# Patient Record
Sex: FEMALE | Race: BLACK OR AFRICAN AMERICAN | Employment: PART TIME | URBAN - NONMETROPOLITAN AREA
[De-identification: names, ages, dates, MRNs, and addresses within clinical notes are randomized per-mention and may not be internally consistent; named-entity substitution may affect disease eponyms.]

---

## 2017-07-27 ENCOUNTER — HOSPITAL ENCOUNTER (EMERGENCY)
Age: 27
Discharge: HOME | End: 2017-07-27
Attending: EMERGENCY MEDICINE
Payer: MEDICARE

## 2017-07-27 VITALS
TEMPERATURE: 97.7 F | BODY MASS INDEX: 23.54 KG/M2 | HEART RATE: 67 BPM | SYSTOLIC BLOOD PRESSURE: 104 MMHG | DIASTOLIC BLOOD PRESSURE: 75 MMHG | HEIGHT: 67 IN | RESPIRATION RATE: 14 BRPM | WEIGHT: 150 LBS | OXYGEN SATURATION: 100 %

## 2017-07-27 DIAGNOSIS — G43.009 NONINTRACTABLE MIGRAINE, UNSPECIFIED MIGRAINE TYPE: Primary | ICD-10-CM

## 2017-07-27 PROCEDURE — 99283 EMERGENCY DEPT VISIT LOW MDM: CPT

## 2017-07-27 PROCEDURE — 6360000002 HC RX W HCPCS: Performed by: EMERGENCY MEDICINE

## 2017-07-27 PROCEDURE — 2580000003 HC RX 258: Performed by: EMERGENCY MEDICINE

## 2017-07-27 PROCEDURE — 96361 HYDRATE IV INFUSION ADD-ON: CPT

## 2017-07-27 PROCEDURE — 96374 THER/PROPH/DIAG INJ IV PUSH: CPT

## 2017-07-27 PROCEDURE — 96375 TX/PRO/DX INJ NEW DRUG ADDON: CPT

## 2017-07-27 RX ORDER — COVID-19 ANTIGEN TEST
KIT MISCELLANEOUS
COMMUNITY

## 2017-07-27 RX ORDER — DIPHENHYDRAMINE HYDROCHLORIDE 50 MG/ML
25 INJECTION INTRAMUSCULAR; INTRAVENOUS ONCE
Status: COMPLETED | OUTPATIENT
Start: 2017-07-27 | End: 2017-07-27

## 2017-07-27 RX ORDER — 0.9 % SODIUM CHLORIDE 0.9 %
1000 INTRAVENOUS SOLUTION INTRAVENOUS ONCE
Status: COMPLETED | OUTPATIENT
Start: 2017-07-27 | End: 2017-07-27

## 2017-07-27 RX ORDER — KETOROLAC TROMETHAMINE 30 MG/ML
30 INJECTION, SOLUTION INTRAMUSCULAR; INTRAVENOUS ONCE
Status: COMPLETED | OUTPATIENT
Start: 2017-07-27 | End: 2017-07-27

## 2017-07-27 RX ADMIN — PROCHLORPERAZINE EDISYLATE 10 MG: 5 INJECTION INTRAMUSCULAR; INTRAVENOUS at 05:00

## 2017-07-27 RX ADMIN — SODIUM CHLORIDE 1000 ML: 9 INJECTION, SOLUTION INTRAVENOUS at 04:59

## 2017-07-27 RX ADMIN — DIPHENHYDRAMINE HYDROCHLORIDE 25 MG: 50 INJECTION, SOLUTION INTRAMUSCULAR; INTRAVENOUS at 05:00

## 2017-07-27 RX ADMIN — KETOROLAC TROMETHAMINE 30 MG: 30 INJECTION, SOLUTION INTRAMUSCULAR at 04:59

## 2017-07-27 ASSESSMENT — PAIN SCALES - GENERAL
PAINLEVEL_OUTOF10: 2
PAINLEVEL_OUTOF10: 10

## 2017-07-27 ASSESSMENT — PAIN DESCRIPTION - PAIN TYPE
TYPE: ACUTE PAIN
TYPE: ACUTE PAIN

## 2017-07-27 ASSESSMENT — PAIN DESCRIPTION - DESCRIPTORS
DESCRIPTORS: HEADACHE
DESCRIPTORS: HEADACHE;THROBBING

## 2017-07-27 ASSESSMENT — PAIN DESCRIPTION - LOCATION
LOCATION: HEAD
LOCATION: HEAD

## 2017-11-20 ENCOUNTER — HOSPITAL ENCOUNTER (OUTPATIENT)
Dept: WOMENS IMAGING | Age: 27
Discharge: HOME | End: 2017-11-20
Payer: COMMERCIAL

## 2017-11-20 ENCOUNTER — HOSPITAL ENCOUNTER (OUTPATIENT)
Dept: WOMENS IMAGING | Age: 27
End: 2017-11-20
Payer: COMMERCIAL

## 2017-11-20 DIAGNOSIS — Z80.3 FAMILY HISTORY OF BREAST CANCER: ICD-10-CM

## 2017-11-20 PROCEDURE — 76641 ULTRASOUND BREAST COMPLETE: CPT

## 2018-04-10 ENCOUNTER — APPOINTMENT (OUTPATIENT)
Dept: GENERAL RADIOLOGY | Age: 28
End: 2018-04-10
Payer: COMMERCIAL

## 2018-04-10 ENCOUNTER — HOSPITAL ENCOUNTER (EMERGENCY)
Age: 28
Discharge: HOME | End: 2018-04-10
Payer: COMMERCIAL

## 2018-04-10 ENCOUNTER — TELEPHONE (OUTPATIENT)
Dept: ENT CLINIC | Age: 28
End: 2018-04-10

## 2018-04-10 ENCOUNTER — APPOINTMENT (OUTPATIENT)
Dept: ULTRASOUND IMAGING | Age: 28
End: 2018-04-10
Payer: COMMERCIAL

## 2018-04-10 VITALS
RESPIRATION RATE: 18 BRPM | SYSTOLIC BLOOD PRESSURE: 118 MMHG | TEMPERATURE: 98.2 F | DIASTOLIC BLOOD PRESSURE: 75 MMHG | HEART RATE: 78 BPM | OXYGEN SATURATION: 98 % | BODY MASS INDEX: 23.54 KG/M2 | WEIGHT: 150 LBS | HEIGHT: 67 IN

## 2018-04-10 DIAGNOSIS — R22.1 MASS IN NECK: Primary | ICD-10-CM

## 2018-04-10 LAB
ALBUMIN SERPL-MCNC: 4.1 G/DL (ref 3.5–5.1)
ALP BLD-CCNC: 73 U/L (ref 38–126)
ALT SERPL-CCNC: 22 U/L (ref 11–66)
ANION GAP SERPL CALCULATED.3IONS-SCNC: 8 MEQ/L (ref 8–16)
AST SERPL-CCNC: 19 U/L (ref 5–40)
BASOPHILS # BLD: 0.5 %
BASOPHILS ABSOLUTE: 0 THOU/MM3 (ref 0–0.1)
BILIRUB SERPL-MCNC: 0.4 MG/DL (ref 0.3–1.2)
BUN BLDV-MCNC: 8 MG/DL (ref 7–22)
CALCIUM SERPL-MCNC: 8.9 MG/DL (ref 8.5–10.5)
CHLORIDE BLD-SCNC: 103 MEQ/L (ref 98–111)
CO2: 28 MEQ/L (ref 23–33)
CREAT SERPL-MCNC: 0.6 MG/DL (ref 0.4–1.2)
EOSINOPHIL # BLD: 2.7 %
EOSINOPHILS ABSOLUTE: 0.2 THOU/MM3 (ref 0–0.4)
GFR SERPL CREATININE-BSD FRML MDRD: > 90 ML/MIN/1.73M2
GLUCOSE BLD-MCNC: 94 MG/DL (ref 70–108)
HCT VFR BLD CALC: 41.2 % (ref 37–47)
HEMOGLOBIN: 14.3 GM/DL (ref 12–16)
LYMPHOCYTES # BLD: 22.3 %
LYMPHOCYTES ABSOLUTE: 1.5 THOU/MM3 (ref 1–4.8)
MCH RBC QN AUTO: 31.8 PG (ref 27–31)
MCHC RBC AUTO-ENTMCNC: 34.6 GM/DL (ref 33–37)
MCV RBC AUTO: 91.8 FL (ref 81–99)
MONOCYTES # BLD: 5.5 %
MONOCYTES ABSOLUTE: 0.4 THOU/MM3 (ref 0.4–1.3)
NUCLEATED RED BLOOD CELLS: 0 /100 WBC
OSMOLALITY CALCULATION: 275.6 MOSMOL/KG (ref 275–300)
PDW BLD-RTO: 12.9 % (ref 11.5–14.5)
PLATELET # BLD: 179 THOU/MM3 (ref 130–400)
PMV BLD AUTO: 7.8 FL (ref 7.4–10.4)
POTASSIUM SERPL-SCNC: 4.4 MEQ/L (ref 3.5–5.2)
RBC # BLD: 4.49 MILL/MM3 (ref 4.2–5.4)
SEG NEUTROPHILS: 69 %
SEGMENTED NEUTROPHILS ABSOLUTE COUNT: 4.8 THOU/MM3 (ref 1.8–7.7)
SODIUM BLD-SCNC: 139 MEQ/L (ref 135–145)
TOTAL PROTEIN: 6.9 G/DL (ref 6.1–8)
TSH SERPL DL<=0.05 MIU/L-ACNC: 0.74 UIU/ML (ref 0.4–4.2)
WBC # BLD: 6.9 THOU/MM3 (ref 4.8–10.8)

## 2018-04-10 PROCEDURE — 76536 US EXAM OF HEAD AND NECK: CPT

## 2018-04-10 PROCEDURE — 80053 COMPREHEN METABOLIC PANEL: CPT

## 2018-04-10 PROCEDURE — 6370000000 HC RX 637 (ALT 250 FOR IP): Performed by: PHYSICIAN ASSISTANT

## 2018-04-10 PROCEDURE — 70360 X-RAY EXAM OF NECK: CPT

## 2018-04-10 PROCEDURE — 99284 EMERGENCY DEPT VISIT MOD MDM: CPT

## 2018-04-10 PROCEDURE — 84443 ASSAY THYROID STIM HORMONE: CPT

## 2018-04-10 PROCEDURE — 36415 COLL VENOUS BLD VENIPUNCTURE: CPT

## 2018-04-10 PROCEDURE — 85025 COMPLETE CBC W/AUTO DIFF WBC: CPT

## 2018-04-10 RX ADMIN — Medication 30 ML: at 09:48

## 2018-04-10 ASSESSMENT — ENCOUNTER SYMPTOMS
VOMITING: 0
EYE DISCHARGE: 0
PHOTOPHOBIA: 0
SHORTNESS OF BREATH: 0
ABDOMINAL DISTENTION: 0
RHINORRHEA: 0
DIARRHEA: 0
FACIAL SWELLING: 0
SORE THROAT: 0
EYE REDNESS: 0
STRIDOR: 0
COLOR CHANGE: 0
NAUSEA: 0
COUGH: 0

## 2018-07-06 ENCOUNTER — APPOINTMENT (OUTPATIENT)
Dept: CT IMAGING | Age: 28
End: 2018-07-06
Payer: COMMERCIAL

## 2018-07-06 ENCOUNTER — HOSPITAL ENCOUNTER (EMERGENCY)
Age: 28
Discharge: HOME | End: 2018-07-06
Attending: EMERGENCY MEDICINE
Payer: COMMERCIAL

## 2018-07-06 VITALS
RESPIRATION RATE: 20 BRPM | HEIGHT: 67 IN | TEMPERATURE: 97.3 F | HEART RATE: 78 BPM | DIASTOLIC BLOOD PRESSURE: 91 MMHG | OXYGEN SATURATION: 98 % | SYSTOLIC BLOOD PRESSURE: 120 MMHG | WEIGHT: 150 LBS | BODY MASS INDEX: 23.54 KG/M2

## 2018-07-06 DIAGNOSIS — R10.9 LEFT FLANK PAIN: Primary | ICD-10-CM

## 2018-07-06 LAB
ALBUMIN SERPL-MCNC: 4.5 G/DL (ref 3.5–5.1)
ALP BLD-CCNC: 86 U/L (ref 38–126)
ALT SERPL-CCNC: 17 U/L (ref 11–66)
ANION GAP SERPL CALCULATED.3IONS-SCNC: 16 MEQ/L (ref 8–16)
AST SERPL-CCNC: 17 U/L (ref 5–40)
BASOPHILS # BLD: 0.7 %
BASOPHILS ABSOLUTE: 0.1 THOU/MM3 (ref 0–0.1)
BILIRUB SERPL-MCNC: 0.6 MG/DL (ref 0.3–1.2)
BILIRUBIN DIRECT: < 0.2 MG/DL (ref 0–0.3)
BUN BLDV-MCNC: 15 MG/DL (ref 7–22)
CALCIUM SERPL-MCNC: 9.7 MG/DL (ref 8.5–10.5)
CHLORIDE BLD-SCNC: 106 MEQ/L (ref 98–111)
CO2: 21 MEQ/L (ref 23–33)
CREAT SERPL-MCNC: 0.8 MG/DL (ref 0.4–1.2)
EOSINOPHIL # BLD: 2 %
EOSINOPHILS ABSOLUTE: 0.2 THOU/MM3 (ref 0–0.4)
ERYTHROCYTE [DISTWIDTH] IN BLOOD BY AUTOMATED COUNT: 12.2 % (ref 11.5–14.5)
ERYTHROCYTE [DISTWIDTH] IN BLOOD BY AUTOMATED COUNT: 40.9 FL (ref 35–45)
GFR SERPL CREATININE-BSD FRML MDRD: > 90 ML/MIN/1.73M2
GLUCOSE BLD-MCNC: 115 MG/DL (ref 70–108)
HCT VFR BLD CALC: 43.1 % (ref 37–47)
HEMOGLOBIN: 14.8 GM/DL (ref 12–16)
IMMATURE GRANS (ABS): 0.01 THOU/MM3 (ref 0–0.07)
IMMATURE GRANULOCYTES: 0.1 %
LIPASE: 32.1 U/L (ref 5.6–51.3)
LYMPHOCYTES # BLD: 44.1 %
LYMPHOCYTES ABSOLUTE: 4.4 THOU/MM3 (ref 1–4.8)
MCH RBC QN AUTO: 31.5 PG (ref 26–33)
MCHC RBC AUTO-ENTMCNC: 34.3 GM/DL (ref 32.2–35.5)
MCV RBC AUTO: 91.7 FL (ref 81–99)
MONOCYTES # BLD: 7.2 %
MONOCYTES ABSOLUTE: 0.7 THOU/MM3 (ref 0.4–1.3)
NUCLEATED RED BLOOD CELLS: 0 /100 WBC
OSMOLALITY CALCULATION: 286.7 MOSMOL/KG (ref 275–300)
PLATELET # BLD: 225 THOU/MM3 (ref 130–400)
PMV BLD AUTO: 9.9 FL (ref 9.4–12.4)
POTASSIUM SERPL-SCNC: 3.8 MEQ/L (ref 3.5–5.2)
PREGNANCY, SERUM: NEGATIVE
RBC # BLD: 4.7 MILL/MM3 (ref 4.2–5.4)
SEG NEUTROPHILS: 45.9 %
SEGMENTED NEUTROPHILS ABSOLUTE COUNT: 4.5 THOU/MM3 (ref 1.8–7.7)
SODIUM BLD-SCNC: 143 MEQ/L (ref 135–145)
TOTAL PROTEIN: 7.2 G/DL (ref 6.1–8)
WBC # BLD: 9.9 THOU/MM3 (ref 4.8–10.8)

## 2018-07-06 PROCEDURE — 2580000003 HC RX 258: Performed by: EMERGENCY MEDICINE

## 2018-07-06 PROCEDURE — 85025 COMPLETE CBC W/AUTO DIFF WBC: CPT

## 2018-07-06 PROCEDURE — 74176 CT ABD & PELVIS W/O CONTRAST: CPT

## 2018-07-06 PROCEDURE — 96374 THER/PROPH/DIAG INJ IV PUSH: CPT

## 2018-07-06 PROCEDURE — 36415 COLL VENOUS BLD VENIPUNCTURE: CPT

## 2018-07-06 PROCEDURE — 83690 ASSAY OF LIPASE: CPT

## 2018-07-06 PROCEDURE — 6360000002 HC RX W HCPCS: Performed by: EMERGENCY MEDICINE

## 2018-07-06 PROCEDURE — 84703 CHORIONIC GONADOTROPIN ASSAY: CPT

## 2018-07-06 PROCEDURE — 80053 COMPREHEN METABOLIC PANEL: CPT

## 2018-07-06 PROCEDURE — 82248 BILIRUBIN DIRECT: CPT

## 2018-07-06 PROCEDURE — 99284 EMERGENCY DEPT VISIT MOD MDM: CPT

## 2018-07-06 PROCEDURE — 96375 TX/PRO/DX INJ NEW DRUG ADDON: CPT

## 2018-07-06 RX ORDER — HYDROCODONE BITARTRATE AND ACETAMINOPHEN 5; 325 MG/1; MG/1
1 TABLET ORAL EVERY 6 HOURS PRN
Qty: 6 TABLET | Refills: 0 | Status: SHIPPED | OUTPATIENT
Start: 2018-07-06 | End: 2018-07-08

## 2018-07-06 RX ORDER — MORPHINE SULFATE 4 MG/ML
6 INJECTION, SOLUTION INTRAMUSCULAR; INTRAVENOUS ONCE
Status: COMPLETED | OUTPATIENT
Start: 2018-07-06 | End: 2018-07-06

## 2018-07-06 RX ORDER — ONDANSETRON 2 MG/ML
4 INJECTION INTRAMUSCULAR; INTRAVENOUS ONCE
Status: COMPLETED | OUTPATIENT
Start: 2018-07-06 | End: 2018-07-06

## 2018-07-06 RX ORDER — 0.9 % SODIUM CHLORIDE 0.9 %
1000 INTRAVENOUS SOLUTION INTRAVENOUS ONCE
Status: COMPLETED | OUTPATIENT
Start: 2018-07-06 | End: 2018-07-06

## 2018-07-06 RX ADMIN — SODIUM CHLORIDE 1000 ML: 9 INJECTION, SOLUTION INTRAVENOUS at 07:35

## 2018-07-06 RX ADMIN — MORPHINE SULFATE 6 MG: 4 INJECTION, SOLUTION INTRAMUSCULAR; INTRAVENOUS at 07:48

## 2018-07-06 RX ADMIN — ONDANSETRON 4 MG: 2 INJECTION INTRAMUSCULAR; INTRAVENOUS at 07:48

## 2018-07-06 ASSESSMENT — PAIN DESCRIPTION - ORIENTATION: ORIENTATION: LEFT

## 2018-07-06 ASSESSMENT — PAIN DESCRIPTION - PAIN TYPE: TYPE: ACUTE PAIN

## 2018-07-06 ASSESSMENT — PAIN SCALES - GENERAL: PAINLEVEL_OUTOF10: 10

## 2018-07-06 ASSESSMENT — PAIN DESCRIPTION - LOCATION: LOCATION: ABDOMEN;FLANK

## 2018-07-06 NOTE — ED PROVIDER NOTES
[facility]  eMERGENCY dEPARTMENT eNCOUnter          [facility]       Chief Complaint   Patient presents with    Flank Pain     Left       Nurses Notes reviewed and I agree except as noted in the HPI. HISTORY OF PRESENT ILLNESS    Madelaine Pineda is a 32 y.o. female presents to the emergency department for the evaluation of left flank pain. Patient reports that she was sleeping and woke up during the middle of the night and changed positions in which she was sleeping when she began experiencing the pain her left flank. She rates her pain at 10/10 in severity. Patient denies all other complaints at initial evaluation. REVIEW OF SYSTEMS     Constitutional: no fever or chills  Respiratory: no dyspnea  Cardiovascular: no chest pain  Gastrointestinal : Left flank pain  : no dysuria      Remainder of review of systems is otherwise reviewed as negative. PAST MEDICAL HISTORY    has a past medical history of ADHD (attention deficit hyperactivity disorder); Anxiety; Depression; and Headache. SURGICAL HISTORY      has a past surgical history that includes LEEP (03/08/2013) and Colonoscopy ([id number]). CURRENT MEDICATIONS       Previous Medications    NAPROXEN SODIUM (ALEVE) 220 MG CAPS    Take by mouth       ALLERGIES     is allergic to codeine. FAMILY HISTORY     indicated that her mother is alive. She indicated that her father is alive. family history includes Anxiety Disorder in her mother; Cancer in her father; High Blood Pressure in her father. SOCIAL HISTORY      reports that she has never smoked. She has never used smokeless tobacco. She reports that she does not drink alcohol. PHYSICAL EXAM     INITIAL VITALS:  height is 5' 7\" (1.702 m) and weight is 150 lb (68 kg). Her oral temperature is 97.3 °F (36.3 °C). Her blood pressure is 133/80 and her pulse is 78. Her respiration is 20 and oxygen saturation is 97%.     Constitutional: Extremely uncomfortable 07/06/18 7:37 AM    Provider:  I personally performed the services described in the documentation, reviewed and edited the documentation which was dictated to the scribe in my presence, and it accurately records my words and actions.     Pat Parra DO 7/6/18 7:37 AM       Pat Parra, DO  07/06/18 [id number]

## 2018-07-06 NOTE — ED NOTES
Patient medicated per order. Resting in bed. Respirations easy, unlabored. Patient no longer screaming out at this time. Updated on plan of care, need for urine sample. Denies needs or concerns at this time. Call light in reach, side rails up x2.       Brian Perez RN  07/06/18 7905

## 2018-07-06 NOTE — ED NOTES
Patient resting in bed. Respirations easy, unlabored. Patient denies pain at this time, is able to sit up in bed and speak with staff and family at bedside. Denies needs or concerns at this time. Call light in reach, side rails up x2.       Toby Hanson, RN  07/06/18 7914

## 2018-07-15 ENCOUNTER — HOSPITAL ENCOUNTER (EMERGENCY)
Age: 28
Discharge: HOME | End: 2018-07-15
Payer: COMMERCIAL

## 2018-07-15 VITALS
SYSTOLIC BLOOD PRESSURE: 123 MMHG | HEART RATE: 84 BPM | DIASTOLIC BLOOD PRESSURE: 76 MMHG | OXYGEN SATURATION: 99 % | RESPIRATION RATE: 18 BRPM | TEMPERATURE: 97.6 F

## 2018-07-15 DIAGNOSIS — L23.7 POISON IVY DERMATITIS: Primary | ICD-10-CM

## 2018-07-15 PROCEDURE — 99202 OFFICE O/P NEW SF 15 MIN: CPT | Performed by: NURSE PRACTITIONER

## 2018-07-15 PROCEDURE — 99212 OFFICE O/P EST SF 10 MIN: CPT

## 2018-07-15 PROCEDURE — 96372 THER/PROPH/DIAG INJ SC/IM: CPT

## 2018-07-15 PROCEDURE — 6360000002 HC RX W HCPCS: Performed by: NURSE PRACTITIONER

## 2018-07-15 RX ORDER — PREDNISONE 10 MG/1
10 TABLET ORAL SEE ADMIN INSTRUCTIONS
Qty: 21 TABLET | Refills: 0 | Status: SHIPPED | OUTPATIENT
Start: 2018-07-15 | End: 2018-07-21

## 2018-07-15 RX ORDER — METHYLPREDNISOLONE ACETATE 80 MG/ML
80 INJECTION, SUSPENSION INTRA-ARTICULAR; INTRALESIONAL; INTRAMUSCULAR; SOFT TISSUE ONCE
Status: COMPLETED | OUTPATIENT
Start: 2018-07-15 | End: 2018-07-15

## 2018-07-15 RX ORDER — SKIN CLEANSER COMB NO.41
CLEANSER (ML) TOPICAL
Refills: 0 | COMMUNITY
Start: 2018-07-15 | End: 2022-07-22

## 2018-07-15 RX ORDER — DIPHENHYDRAMINE HCL 25 MG
25 CAPSULE ORAL EVERY 6 HOURS PRN
Refills: 0 | COMMUNITY
Start: 2018-07-15 | End: 2018-07-25

## 2018-07-15 RX ADMIN — METHYLPREDNISOLONE ACETATE 80 MG: 80 INJECTION, SUSPENSION INTRA-ARTICULAR; INTRALESIONAL; INTRAMUSCULAR; SOFT TISSUE at 12:08

## 2018-07-15 ASSESSMENT — ENCOUNTER SYMPTOMS
CHEST TIGHTNESS: 0
PERI-ORBITAL EDEMA: 0
DIARRHEA: 0
SORE THROAT: 0
COUGH: 0
ABDOMINAL PAIN: 0
THROAT SWELLING: 0
VOMITING: 0
APNEA: 0
NAUSEA: 0
HOARSE VOICE: 0
SHORTNESS OF BREATH: 0
EYE ITCHING: 0
STRIDOR: 0
WHEEZING: 0
CHOKING: 0

## 2018-07-15 ASSESSMENT — PAIN DESCRIPTION - DESCRIPTORS: DESCRIPTORS: ITCHING

## 2018-07-15 ASSESSMENT — PAIN SCALES - GENERAL: PAINLEVEL_OUTOF10: 1

## 2018-07-15 ASSESSMENT — PAIN DESCRIPTION - LOCATION: LOCATION: GENERALIZED

## 2018-07-15 NOTE — ED PROVIDER NOTES
[id number]  Urgent Care Encounter       CHIEF COMPLAINT       Chief Complaint   Patient presents with    Rash     poison ivy        Nurses Notes reviewed and I agree except as noted in the HPI. HISTORY OF PRESENT ILLNESS   Madelaine Aguilar is a 29 y.o. female who presents     The history is provided by the patient. No  was used. Rash   Location:  Shoulder/arm and leg  Shoulder/arm rash location:  L forearm, R forearm, L upper arm and R upper arm  Leg rash location:  L leg and R leg  Quality: dryness, itchiness and redness    Quality: not blistering, not bruising, not burning, not draining, not painful, not peeling, not scaling, not swelling and not weeping    Severity:  Moderate  Onset quality:  Gradual  Duration:  1 week  Timing:  Constant  Progression:  Spreading  Chronicity:  Recurrent (Works in [address] continued exposure)  Context: plant contact    Context: not animal contact, not chemical exposure, not diapers, not eggs, not exposure to similar rash, not food, not hot tub use, not insect bite/sting, not medications, not new detergent/soap, not nuts, not pollen, not pregnancy, not sick contacts and not sun exposure    Relieved by:  Nothing  Worsened by:  Nothing  Ineffective treatments:  Anti-itch cream (OTC cream, spray)  Associated symptoms: induration    Associated symptoms: no abdominal pain, no diarrhea, no fatigue, no fever, no headaches, no hoarse voice, no joint pain, no myalgias, no nausea, no periorbital edema, no shortness of breath, no sore throat, no throat swelling, no tongue swelling, no URI, not vomiting and not wheezing        REVIEW OF SYSTEMS     Review of Systems   Constitutional: Negative for activity change, appetite change, chills, diaphoresis, fatigue and fever. HENT: Negative for congestion, hoarse voice and sore throat. Eyes: Negative for itching.    Respiratory: Negative for apnea, cough, choking, chest tightness, shortness of breath, wheezing and stridor. Cardiovascular: Negative for chest pain, palpitations and leg swelling. Gastrointestinal: Negative for abdominal pain, diarrhea, nausea and vomiting. Musculoskeletal: Negative for arthralgias and myalgias. Skin: Positive for rash. Negative for pallor and wound. Neurological: Negative for dizziness, facial asymmetry, weakness, light-headedness, numbness and headaches. Psychiatric/Behavioral: Negative for sleep disturbance. PAST MEDICAL HISTORY         Diagnosis Date    ADHD (attention deficit hyperactivity disorder) 1997    Anxiety     Depression     Headache(784.0)        SURGICAL HISTORY     Patient  has a past surgical history that includes LEEP (03/08/2013) and Colonoscopy ([id number]). CURRENT MEDICATIONS       Previous Medications    NAPROXEN SODIUM (ALEVE) 220 MG CAPS    Take by mouth       ALLERGIES     Patient is is allergic to codeine. Patients   There is no immunization history on file for this patient. FAMILY HISTORY     Patient's family history includes Anxiety Disorder in her mother; Cancer in her father; High Blood Pressure in her father. SOCIAL HISTORY     Patient  reports that she has never smoked. She has never used smokeless tobacco. She reports that she does not drink alcohol or use drugs. PHYSICAL EXAM     ED TRIAGE VITALS  BP: 123/76, Temp: 97.6 °F (36.4 °C), Pulse: 84, Resp: 18, SpO2: 99 %,Estimated body mass index is 23.49 kg/m² as calculated from the following:    Height as of 7/6/18: 5' 7\" (1.702 m). Weight as of 7/6/18: 150 lb (68 kg). ,No LMP recorded. Patient has had an injection. Physical Exam   Constitutional: She is oriented to person, place, and time. She appears well-developed and well-nourished. Non-toxic appearance. She does not appear ill. No distress. HENT:   Head: Normocephalic. Eyes: Conjunctivae are normal.   Neck: Normal range of motion. Neck supple.    Pulmonary/Chest: Effort normal. No respiratory

## 2020-09-17 ENCOUNTER — HOSPITAL ENCOUNTER (OUTPATIENT)
Age: 30
Discharge: HOME | End: 2020-09-17
Attending: OBSTETRICS & GYNECOLOGY | Admitting: OBSTETRICS & GYNECOLOGY
Payer: COMMERCIAL

## 2020-09-17 VITALS
OXYGEN SATURATION: 98 % | WEIGHT: 183 LBS | HEART RATE: 87 BPM | SYSTOLIC BLOOD PRESSURE: 137 MMHG | TEMPERATURE: 97.6 F | HEIGHT: 67 IN | DIASTOLIC BLOOD PRESSURE: 88 MMHG | BODY MASS INDEX: 28.72 KG/M2 | RESPIRATION RATE: 18 BRPM

## 2020-09-17 LAB
ALBUMIN SERPL-MCNC: 3.3 G/DL (ref 3.5–5.1)
ALP BLD-CCNC: 76 U/L (ref 38–126)
ALT SERPL-CCNC: 15 U/L (ref 11–66)
ANION GAP SERPL CALCULATED.3IONS-SCNC: 11 MEQ/L (ref 8–16)
AST SERPL-CCNC: 23 U/L (ref 5–40)
BASOPHILS # BLD: 0.2 %
BASOPHILS ABSOLUTE: 0 THOU/MM3 (ref 0–0.1)
BILIRUB SERPL-MCNC: 0.3 MG/DL (ref 0.3–1.2)
BUN BLDV-MCNC: 5 MG/DL (ref 7–22)
CALCIUM SERPL-MCNC: 8.6 MG/DL (ref 8.5–10.5)
CHLORIDE BLD-SCNC: 107 MEQ/L (ref 98–111)
CO2: 22 MEQ/L (ref 23–33)
CREAT SERPL-MCNC: 0.5 MG/DL (ref 0.4–1.2)
CREATININE URINE: 255 MG/DL
EOSINOPHIL # BLD: 0.7 %
EOSINOPHILS ABSOLUTE: 0.1 THOU/MM3 (ref 0–0.4)
ERYTHROCYTE [DISTWIDTH] IN BLOOD BY AUTOMATED COUNT: 12.7 % (ref 11.5–14.5)
ERYTHROCYTE [DISTWIDTH] IN BLOOD BY AUTOMATED COUNT: 43.6 FL (ref 35–45)
GFR SERPL CREATININE-BSD FRML MDRD: > 90 ML/MIN/1.73M2
GLUCOSE BLD-MCNC: 83 MG/DL (ref 70–108)
HCT VFR BLD CALC: 35.5 % (ref 37–47)
HEMOGLOBIN: 12 GM/DL (ref 12–16)
IMMATURE GRANS (ABS): 0.07 THOU/MM3 (ref 0–0.07)
IMMATURE GRANULOCYTES: 0.7 %
LYMPHOCYTES # BLD: 14.8 %
LYMPHOCYTES ABSOLUTE: 1.4 THOU/MM3 (ref 1–4.8)
MCH RBC QN AUTO: 31.8 PG (ref 26–33)
MCHC RBC AUTO-ENTMCNC: 33.8 GM/DL (ref 32.2–35.5)
MCV RBC AUTO: 94.2 FL (ref 81–99)
MONOCYTES # BLD: 6.7 %
MONOCYTES ABSOLUTE: 0.6 THOU/MM3 (ref 0.4–1.3)
NUCLEATED RED BLOOD CELLS: 0 /100 WBC
PLATELET # BLD: 155 THOU/MM3 (ref 130–400)
PMV BLD AUTO: 9.9 FL (ref 9.4–12.4)
POTASSIUM SERPL-SCNC: 3.6 MEQ/L (ref 3.5–5.2)
PROT/CREAT RATIO, UR: 0.15
PROTEIN, URINE: 38.6 MG/DL
RBC # BLD: 3.77 MILL/MM3 (ref 4.2–5.4)
SEG NEUTROPHILS: 76.9 %
SEGMENTED NEUTROPHILS ABSOLUTE COUNT: 7.5 THOU/MM3 (ref 1.8–7.7)
SODIUM BLD-SCNC: 140 MEQ/L (ref 135–145)
TOTAL PROTEIN: 6.1 G/DL (ref 6.1–8)
URIC ACID: 4.2 MG/DL (ref 2.4–5.7)
WBC # BLD: 9.7 THOU/MM3 (ref 4.8–10.8)

## 2020-09-17 PROCEDURE — 96360 HYDRATION IV INFUSION INIT: CPT

## 2020-09-17 PROCEDURE — 96361 HYDRATE IV INFUSION ADD-ON: CPT

## 2020-09-17 PROCEDURE — 80053 COMPREHEN METABOLIC PANEL: CPT

## 2020-09-17 PROCEDURE — 84156 ASSAY OF PROTEIN URINE: CPT

## 2020-09-17 PROCEDURE — 84550 ASSAY OF BLOOD/URIC ACID: CPT

## 2020-09-17 PROCEDURE — 2580000003 HC RX 258: Performed by: OBSTETRICS & GYNECOLOGY

## 2020-09-17 PROCEDURE — 85025 COMPLETE CBC W/AUTO DIFF WBC: CPT

## 2020-09-17 PROCEDURE — 82570 ASSAY OF URINE CREATININE: CPT

## 2020-09-17 PROCEDURE — 36415 COLL VENOUS BLD VENIPUNCTURE: CPT

## 2020-09-17 RX ORDER — SODIUM CHLORIDE, SODIUM LACTATE, POTASSIUM CHLORIDE, CALCIUM CHLORIDE 600; 310; 30; 20 MG/100ML; MG/100ML; MG/100ML; MG/100ML
INJECTION, SOLUTION INTRAVENOUS CONTINUOUS
Status: DISCONTINUED | OUTPATIENT
Start: 2020-09-17 | End: 2020-09-17 | Stop reason: HOSPADM

## 2020-09-17 RX ADMIN — SODIUM CHLORIDE, POTASSIUM CHLORIDE, SODIUM LACTATE AND CALCIUM CHLORIDE: 600; 310; 30; 20 INJECTION, SOLUTION INTRAVENOUS at 18:42

## 2020-09-17 RX ADMIN — SODIUM CHLORIDE, POTASSIUM CHLORIDE, SODIUM LACTATE AND CALCIUM CHLORIDE: 600; 310; 30; 20 INJECTION, SOLUTION INTRAVENOUS at 17:40

## 2020-09-17 RX ADMIN — SODIUM CHLORIDE, POTASSIUM CHLORIDE, SODIUM LACTATE AND CALCIUM CHLORIDE: 600; 310; 30; 20 INJECTION, SOLUTION INTRAVENOUS at 18:44

## 2020-09-17 NOTE — PLAN OF CARE
Problem: Healthcare acquired conditions:  Goal: Absence of healthcare acquired conditions  Description: Absence of healthcare acquired conditions  Outcome: Ongoing  Note: FHTs reactive, [place] in place no contractions noted, denies pain at this time, Labs sent see results,    Care plan reviewed with patient and significant other. Patient and significant other verbalize understanding of the plan of care and contribute to goal setting.

## 2020-09-17 NOTE — FLOWSHEET NOTE
admitted at 28/5 weeks gest after having fainted at home. Denies pain or SROM. States feeling fetal movements but states she has not felt good for past two days and has not ate or drank much either.  EFM applied to soft non tender abd

## 2020-09-18 NOTE — FLOWSHEET NOTE
Discharge instructions reviewed, questions answered, pt states comfortable with being discharged, monitors removed from soft non tender abdomen, IV removed, pt up to change. Discharge instructions signed.

## 2020-09-18 NOTE — FLOWSHEET NOTE
Pt ambulated off unit stable with discharge instructions, belongings in hand, significant other at side.

## 2020-10-17 NOTE — PROGRESS NOTES
[address] [id number]                                NON STRESS TEST    PATIENT NAME: Zana Hinton                :        1990  MED REC NO:   [id number]                           ROOM:       0005  ACCOUNT NO:   [de-identified]                           ADMIT DATE: 2020  PROVIDER:     JOSE ELIAS Rowelln:  2020    INDICATIONS:  The patient is a 70-year-old  who presented at 29 and  5 weeks after she passed out. She was evaluated with prolonged  monitoring and had a reactive tracing 140 with moderate variability and  accelerations. No evidence of contractions on the tocometer. Her vital  signs were normal.  She was discharged home, precautions reviewed, and  instructed to follow up as scheduled in the office.         Jhon Metcalf M.D.    D: 10/16/2020 10:49:41       T: 10/16/2020 15:34:35     [email or web address]  Job#: [id number]     Doc#: [id number]    CC:

## 2020-11-13 ENCOUNTER — HOSPITAL ENCOUNTER (OUTPATIENT)
Age: 30
Discharge: HOME | DRG: 560 | End: 2020-11-13
Attending: OBSTETRICS & GYNECOLOGY | Admitting: OBSTETRICS & GYNECOLOGY
Payer: COMMERCIAL

## 2020-11-13 VITALS
HEART RATE: 80 BPM | DIASTOLIC BLOOD PRESSURE: 76 MMHG | TEMPERATURE: 98.2 F | OXYGEN SATURATION: 99 % | RESPIRATION RATE: 18 BRPM | SYSTOLIC BLOOD PRESSURE: 119 MMHG

## 2020-11-13 PROBLEM — O13.3 GESTATIONAL HYPERTENSION, THIRD TRIMESTER: Status: ACTIVE | Noted: 2020-11-13

## 2020-11-13 LAB
ABO: NORMAL
ALBUMIN SERPL-MCNC: 3.7 G/DL (ref 3.5–5.1)
ALP BLD-CCNC: 121 U/L (ref 38–126)
ALT SERPL-CCNC: 10 U/L (ref 11–66)
ANION GAP SERPL CALCULATED.3IONS-SCNC: 13 MEQ/L (ref 8–16)
ANTIBODY SCREEN: NORMAL
AST SERPL-CCNC: 21 U/L (ref 5–40)
BILIRUB SERPL-MCNC: 0.5 MG/DL (ref 0.3–1.2)
BUN BLDV-MCNC: 5 MG/DL (ref 7–22)
CALCIUM SERPL-MCNC: 9.4 MG/DL (ref 8.5–10.5)
CHLORIDE BLD-SCNC: 106 MEQ/L (ref 98–111)
CO2: 22 MEQ/L (ref 23–33)
CREAT SERPL-MCNC: 0.5 MG/DL (ref 0.4–1.2)
CREATININE URINE: 172.1 MG/DL
ERYTHROCYTE [DISTWIDTH] IN BLOOD BY AUTOMATED COUNT: 12.9 % (ref 11.5–14.5)
ERYTHROCYTE [DISTWIDTH] IN BLOOD BY AUTOMATED COUNT: 43.9 FL (ref 35–45)
GFR SERPL CREATININE-BSD FRML MDRD: > 90 ML/MIN/1.73M2
GLUCOSE BLD-MCNC: 71 MG/DL (ref 70–108)
HCT VFR BLD CALC: 35.4 % (ref 37–47)
HEMOGLOBIN: 11.9 GM/DL (ref 12–16)
MCH RBC QN AUTO: 31.5 PG (ref 26–33)
MCHC RBC AUTO-ENTMCNC: 33.6 GM/DL (ref 32.2–35.5)
MCV RBC AUTO: 93.7 FL (ref 81–99)
PLATELET # BLD: 148 THOU/MM3 (ref 130–400)
PMV BLD AUTO: 10.3 FL (ref 9.4–12.4)
POTASSIUM SERPL-SCNC: 3.9 MEQ/L (ref 3.5–5.2)
PROT/CREAT RATIO, UR: 0.1
PROTEIN, URINE: 17.7 MG/DL
RBC # BLD: 3.78 MILL/MM3 (ref 4.2–5.4)
RH FACTOR: NORMAL
RPR: NONREACTIVE
SODIUM BLD-SCNC: 141 MEQ/L (ref 135–145)
TOTAL PROTEIN: 6.4 G/DL (ref 6.1–8)
WBC # BLD: 9.7 THOU/MM3 (ref 4.8–10.8)

## 2020-11-13 PROCEDURE — 86850 RBC ANTIBODY SCREEN: CPT

## 2020-11-13 PROCEDURE — 80053 COMPREHEN METABOLIC PANEL: CPT

## 2020-11-13 PROCEDURE — 85027 COMPLETE CBC AUTOMATED: CPT

## 2020-11-13 PROCEDURE — 86592 SYPHILIS TEST NON-TREP QUAL: CPT

## 2020-11-13 PROCEDURE — 86901 BLOOD TYPING SEROLOGIC RH(D): CPT

## 2020-11-13 PROCEDURE — 84156 ASSAY OF PROTEIN URINE: CPT

## 2020-11-13 PROCEDURE — 86900 BLOOD TYPING SEROLOGIC ABO: CPT

## 2020-11-13 PROCEDURE — 2580000003 HC RX 258: Performed by: OBSTETRICS & GYNECOLOGY

## 2020-11-13 PROCEDURE — 82570 ASSAY OF URINE CREATININE: CPT

## 2020-11-13 PROCEDURE — 36415 COLL VENOUS BLD VENIPUNCTURE: CPT

## 2020-11-13 RX ORDER — NIFEDIPINE 10 MG/1
30 CAPSULE ORAL DAILY
Status: ON HOLD | COMMUNITY
End: 2020-11-17 | Stop reason: HOSPADM

## 2020-11-13 RX ORDER — SODIUM CHLORIDE, SODIUM LACTATE, POTASSIUM CHLORIDE, CALCIUM CHLORIDE 600; 310; 30; 20 MG/100ML; MG/100ML; MG/100ML; MG/100ML
INJECTION, SOLUTION INTRAVENOUS CONTINUOUS
Status: DISCONTINUED | OUTPATIENT
Start: 2020-11-13 | End: 2020-11-13 | Stop reason: HOSPADM

## 2020-11-13 RX ADMIN — SODIUM CHLORIDE, POTASSIUM CHLORIDE, SODIUM LACTATE AND CALCIUM CHLORIDE: 600; 310; 30; 20 INJECTION, SOLUTION INTRAVENOUS at 13:00

## 2020-11-13 NOTE — FLOWSHEET NOTE
Dr Ajith Li called with labs, BPs, reactive FHR tracing. Informed pt still has a headache but denies wanting any pain meds for it. Pt talking with people on phone, cheerful, laughing and talkative. Orders received for discharge.

## 2020-11-16 ENCOUNTER — ANESTHESIA (OUTPATIENT)
Dept: LABOR AND DELIVERY | Age: 30
DRG: 560 | End: 2020-11-16
Payer: COMMERCIAL

## 2020-11-16 ENCOUNTER — ANESTHESIA EVENT (OUTPATIENT)
Dept: LABOR AND DELIVERY | Age: 30
DRG: 560 | End: 2020-11-16
Payer: COMMERCIAL

## 2020-11-16 ENCOUNTER — HOSPITAL ENCOUNTER (INPATIENT)
Age: 30
LOS: 1 days | Discharge: HOME | DRG: 560 | End: 2020-11-17
Attending: OBSTETRICS & GYNECOLOGY | Admitting: OBSTETRICS & GYNECOLOGY
Payer: COMMERCIAL

## 2020-11-16 LAB
ABO: NORMAL
ALBUMIN SERPL-MCNC: 3.7 G/DL (ref 3.5–5.1)
ALP BLD-CCNC: 128 U/L (ref 38–126)
ALT SERPL-CCNC: 9 U/L (ref 11–66)
AMPHETAMINE+METHAMPHETAMINE URINE SCREEN: NEGATIVE
ANION GAP SERPL CALCULATED.3IONS-SCNC: 15 MEQ/L (ref 8–16)
ANTIBODY SCREEN: NORMAL
AST SERPL-CCNC: 15 U/L (ref 5–40)
BARBITURATE QUANTITATIVE URINE: NEGATIVE
BENZODIAZEPINE QUANTITATIVE URINE: NEGATIVE
BILIRUB SERPL-MCNC: 0.5 MG/DL (ref 0.3–1.2)
BUN BLDV-MCNC: 5 MG/DL (ref 7–22)
CALCIUM SERPL-MCNC: 9.2 MG/DL (ref 8.5–10.5)
CANNABINOID QUANTITATIVE URINE: NEGATIVE
CHLORIDE BLD-SCNC: 104 MEQ/L (ref 98–111)
CO2: 19 MEQ/L (ref 23–33)
COCAINE METABOLITE QUANTITATIVE URINE: NEGATIVE
CREAT SERPL-MCNC: 0.5 MG/DL (ref 0.4–1.2)
CREATININE URINE: 236.6 MG/DL
ERYTHROCYTE [DISTWIDTH] IN BLOOD BY AUTOMATED COUNT: 12.8 % (ref 11.5–14.5)
ERYTHROCYTE [DISTWIDTH] IN BLOOD BY AUTOMATED COUNT: 43.2 FL (ref 35–45)
GFR SERPL CREATININE-BSD FRML MDRD: > 90 ML/MIN/1.73M2
GLUCOSE BLD-MCNC: 82 MG/DL (ref 70–108)
HCT VFR BLD CALC: 36.2 % (ref 37–47)
HEMOGLOBIN: 12 GM/DL (ref 12–16)
MCH RBC QN AUTO: 30.8 PG (ref 26–33)
MCHC RBC AUTO-ENTMCNC: 33.1 GM/DL (ref 32.2–35.5)
MCV RBC AUTO: 92.8 FL (ref 81–99)
OPIATES, URINE: NEGATIVE
OXYCODONE: NEGATIVE
PHENCYCLIDINE QUANTITATIVE URINE: NEGATIVE
PLATELET # BLD: 155 THOU/MM3 (ref 130–400)
PMV BLD AUTO: 10 FL (ref 9.4–12.4)
POTASSIUM SERPL-SCNC: 3.6 MEQ/L (ref 3.5–5.2)
PROT/CREAT RATIO, UR: 0.17
PROTEIN, URINE: 40 MG/DL
RBC # BLD: 3.9 MILL/MM3 (ref 4.2–5.4)
RH FACTOR: NORMAL
RPR: NONREACTIVE
SODIUM BLD-SCNC: 138 MEQ/L (ref 135–145)
TOTAL PROTEIN: 6.4 G/DL (ref 6.1–8)
WBC # BLD: 10.4 THOU/MM3 (ref 4.8–10.8)

## 2020-11-16 PROCEDURE — 36415 COLL VENOUS BLD VENIPUNCTURE: CPT

## 2020-11-16 PROCEDURE — 10907ZC DRAINAGE OF AMNIOTIC FLUID, THERAPEUTIC FROM PRODUCTS OF CONCEPTION, VIA NATURAL OR ARTIFICIAL OPENING: ICD-10-PCS | Performed by: OBSTETRICS & GYNECOLOGY

## 2020-11-16 PROCEDURE — 80053 COMPREHEN METABOLIC PANEL: CPT

## 2020-11-16 PROCEDURE — 6360000002 HC RX W HCPCS

## 2020-11-16 PROCEDURE — 3700000025 EPIDURAL BLOCK: Performed by: ANESTHESIOLOGY

## 2020-11-16 PROCEDURE — 86900 BLOOD TYPING SEROLOGIC ABO: CPT

## 2020-11-16 PROCEDURE — 82570 ASSAY OF URINE CREATININE: CPT

## 2020-11-16 PROCEDURE — 86901 BLOOD TYPING SEROLOGIC RH(D): CPT

## 2020-11-16 PROCEDURE — 6370000000 HC RX 637 (ALT 250 FOR IP): Performed by: ANESTHESIOLOGY

## 2020-11-16 PROCEDURE — 6370000000 HC RX 637 (ALT 250 FOR IP): Performed by: OBSTETRICS & GYNECOLOGY

## 2020-11-16 PROCEDURE — 6360000002 HC RX W HCPCS: Performed by: OBSTETRICS & GYNECOLOGY

## 2020-11-16 PROCEDURE — 2580000003 HC RX 258: Performed by: OBSTETRICS & GYNECOLOGY

## 2020-11-16 PROCEDURE — 7200000001 HC VAGINAL DELIVERY

## 2020-11-16 PROCEDURE — 85027 COMPLETE CBC AUTOMATED: CPT

## 2020-11-16 PROCEDURE — 86850 RBC ANTIBODY SCREEN: CPT

## 2020-11-16 PROCEDURE — 80307 DRUG TEST PRSMV CHEM ANLYZR: CPT

## 2020-11-16 PROCEDURE — 84156 ASSAY OF PROTEIN URINE: CPT

## 2020-11-16 PROCEDURE — 1200000000 HC SEMI PRIVATE

## 2020-11-16 PROCEDURE — 3E033VJ INTRODUCTION OF OTHER HORMONE INTO PERIPHERAL VEIN, PERCUTANEOUS APPROACH: ICD-10-PCS | Performed by: OBSTETRICS & GYNECOLOGY

## 2020-11-16 PROCEDURE — 86592 SYPHILIS TEST NON-TREP QUAL: CPT

## 2020-11-16 PROCEDURE — 6360000002 HC RX W HCPCS: Performed by: NURSE ANESTHETIST, CERTIFIED REGISTERED

## 2020-11-16 PROCEDURE — 2500000003 HC RX 250 WO HCPCS: Performed by: ANESTHESIOLOGY

## 2020-11-16 RX ORDER — HYDROCODONE BITARTRATE AND ACETAMINOPHEN 5; 325 MG/1; MG/1
2 TABLET ORAL EVERY 4 HOURS PRN
Status: DISCONTINUED | OUTPATIENT
Start: 2020-11-16 | End: 2020-11-17 | Stop reason: HOSPADM

## 2020-11-16 RX ORDER — ZOLPIDEM TARTRATE 10 MG/1
10 TABLET ORAL NIGHTLY PRN
Status: DISCONTINUED | OUTPATIENT
Start: 2020-11-16 | End: 2020-11-17 | Stop reason: HOSPADM

## 2020-11-16 RX ORDER — METHYLERGONOVINE MALEATE 0.2 MG/ML
200 INJECTION INTRAVENOUS PRN
Status: DISCONTINUED | OUTPATIENT
Start: 2020-11-16 | End: 2020-11-16

## 2020-11-16 RX ORDER — SODIUM CHLORIDE, SODIUM LACTATE, POTASSIUM CHLORIDE, CALCIUM CHLORIDE 600; 310; 30; 20 MG/100ML; MG/100ML; MG/100ML; MG/100ML
INJECTION, SOLUTION INTRAVENOUS CONTINUOUS
Status: DISCONTINUED | OUTPATIENT
Start: 2020-11-16 | End: 2020-11-17 | Stop reason: HOSPADM

## 2020-11-16 RX ORDER — ACETAMINOPHEN 325 MG/1
650 TABLET ORAL EVERY 4 HOURS PRN
Status: DISCONTINUED | OUTPATIENT
Start: 2020-11-16 | End: 2020-11-17 | Stop reason: HOSPADM

## 2020-11-16 RX ORDER — BUTORPHANOL TARTRATE 1 MG/ML
1 INJECTION, SOLUTION INTRAMUSCULAR; INTRAVENOUS
Status: DISCONTINUED | OUTPATIENT
Start: 2020-11-16 | End: 2020-11-16

## 2020-11-16 RX ORDER — SODIUM CHLORIDE 0.9 % (FLUSH) 0.9 %
10 SYRINGE (ML) INJECTION PRN
Status: DISCONTINUED | OUTPATIENT
Start: 2020-11-16 | End: 2020-11-16

## 2020-11-16 RX ORDER — DIPHENHYDRAMINE HYDROCHLORIDE 50 MG/ML
25 INJECTION INTRAMUSCULAR; INTRAVENOUS EVERY 4 HOURS PRN
Status: DISCONTINUED | OUTPATIENT
Start: 2020-11-16 | End: 2020-11-16

## 2020-11-16 RX ORDER — HYDROCODONE BITARTRATE AND ACETAMINOPHEN 5; 325 MG/1; MG/1
1 TABLET ORAL EVERY 4 HOURS PRN
Status: DISCONTINUED | OUTPATIENT
Start: 2020-11-16 | End: 2020-11-17 | Stop reason: HOSPADM

## 2020-11-16 RX ORDER — MISOPROSTOL 200 UG/1
1000 TABLET ORAL PRN
Status: DISCONTINUED | OUTPATIENT
Start: 2020-11-16 | End: 2020-11-16

## 2020-11-16 RX ORDER — SEVOFLURANE 250 ML/250ML
1 LIQUID RESPIRATORY (INHALATION) CONTINUOUS PRN
Status: DISCONTINUED | OUTPATIENT
Start: 2020-11-16 | End: 2020-11-16

## 2020-11-16 RX ORDER — IBUPROFEN 800 MG/1
800 TABLET ORAL 3 TIMES DAILY
Status: DISCONTINUED | OUTPATIENT
Start: 2020-11-16 | End: 2020-11-17 | Stop reason: HOSPADM

## 2020-11-16 RX ORDER — ROPIVACAINE HYDROCHLORIDE 2 MG/ML
INJECTION, SOLUTION EPIDURAL; INFILTRATION; PERINEURAL
Status: COMPLETED
Start: 2020-11-16 | End: 2020-11-16

## 2020-11-16 RX ORDER — ROPIVACAINE HYDROCHLORIDE 2 MG/ML
INJECTION, SOLUTION EPIDURAL; INFILTRATION; PERINEURAL PRN
Status: DISCONTINUED | OUTPATIENT
Start: 2020-11-16 | End: 2020-11-16 | Stop reason: SDUPTHER

## 2020-11-16 RX ORDER — ONDANSETRON 4 MG/1
8 TABLET, ORALLY DISINTEGRATING ORAL EVERY 8 HOURS PRN
Status: DISCONTINUED | OUTPATIENT
Start: 2020-11-16 | End: 2020-11-17 | Stop reason: HOSPADM

## 2020-11-16 RX ORDER — NIFEDIPINE 10 MG/1
30 CAPSULE ORAL DAILY
Status: DISCONTINUED | OUTPATIENT
Start: 2020-11-16 | End: 2020-11-17

## 2020-11-16 RX ORDER — SODIUM CHLORIDE, SODIUM LACTATE, POTASSIUM CHLORIDE, CALCIUM CHLORIDE 600; 310; 30; 20 MG/100ML; MG/100ML; MG/100ML; MG/100ML
INJECTION, SOLUTION INTRAVENOUS CONTINUOUS
Status: DISCONTINUED | OUTPATIENT
Start: 2020-11-16 | End: 2020-11-16

## 2020-11-16 RX ORDER — MODIFIED LANOLIN
OINTMENT (GRAM) TOPICAL PRN
Status: DISCONTINUED | OUTPATIENT
Start: 2020-11-16 | End: 2020-11-17 | Stop reason: HOSPADM

## 2020-11-16 RX ORDER — NALBUPHINE HCL 10 MG/ML
10 AMPUL (ML) INJECTION
Status: DISCONTINUED | OUTPATIENT
Start: 2020-11-16 | End: 2020-11-16

## 2020-11-16 RX ORDER — CARBOPROST TROMETHAMINE 250 UG/ML
250 INJECTION, SOLUTION INTRAMUSCULAR
Status: ACTIVE | OUTPATIENT
Start: 2020-11-16 | End: 2020-11-16

## 2020-11-16 RX ORDER — TERBUTALINE SULFATE 1 MG/ML
0.25 INJECTION, SOLUTION SUBCUTANEOUS ONCE
Status: DISCONTINUED | OUTPATIENT
Start: 2020-11-16 | End: 2020-11-16

## 2020-11-16 RX ORDER — SODIUM CHLORIDE 0.9 % (FLUSH) 0.9 %
10 SYRINGE (ML) INJECTION EVERY 12 HOURS SCHEDULED
Status: DISCONTINUED | OUTPATIENT
Start: 2020-11-16 | End: 2020-11-16

## 2020-11-16 RX ORDER — DIPHENHYDRAMINE HCL 25 MG
50 TABLET ORAL EVERY 6 HOURS PRN
Status: DISCONTINUED | OUTPATIENT
Start: 2020-11-16 | End: 2020-11-17 | Stop reason: HOSPADM

## 2020-11-16 RX ORDER — ONDANSETRON 2 MG/ML
4 INJECTION INTRAMUSCULAR; INTRAVENOUS EVERY 6 HOURS PRN
Status: DISCONTINUED | OUTPATIENT
Start: 2020-11-16 | End: 2020-11-17 | Stop reason: HOSPADM

## 2020-11-16 RX ORDER — HYDROXYZINE PAMOATE 25 MG/1
25 CAPSULE ORAL 3 TIMES DAILY PRN
Status: DISCONTINUED | OUTPATIENT
Start: 2020-11-16 | End: 2020-11-17 | Stop reason: HOSPADM

## 2020-11-16 RX ORDER — NALOXONE HYDROCHLORIDE 0.4 MG/ML
0.4 INJECTION, SOLUTION INTRAMUSCULAR; INTRAVENOUS; SUBCUTANEOUS PRN
Status: DISCONTINUED | OUTPATIENT
Start: 2020-11-16 | End: 2020-11-16

## 2020-11-16 RX ORDER — DOCUSATE SODIUM 100 MG/1
100 CAPSULE, LIQUID FILLED ORAL 2 TIMES DAILY PRN
Status: DISCONTINUED | OUTPATIENT
Start: 2020-11-16 | End: 2020-11-16

## 2020-11-16 RX ORDER — DOCUSATE SODIUM 100 MG/1
100 CAPSULE, LIQUID FILLED ORAL 2 TIMES DAILY PRN
Status: DISCONTINUED | OUTPATIENT
Start: 2020-11-16 | End: 2020-11-17 | Stop reason: HOSPADM

## 2020-11-16 RX ORDER — MORPHINE SULFATE 2 MG/ML
2 INJECTION, SOLUTION INTRAMUSCULAR; INTRAVENOUS
Status: DISCONTINUED | OUTPATIENT
Start: 2020-11-16 | End: 2020-11-17 | Stop reason: HOSPADM

## 2020-11-16 RX ORDER — ONDANSETRON 2 MG/ML
8 INJECTION INTRAMUSCULAR; INTRAVENOUS EVERY 6 HOURS PRN
Status: DISCONTINUED | OUTPATIENT
Start: 2020-11-16 | End: 2020-11-16

## 2020-11-16 RX ORDER — MISOPROSTOL 200 UG/1
800 TABLET ORAL
Status: ACTIVE | OUTPATIENT
Start: 2020-11-16 | End: 2020-11-16

## 2020-11-16 RX ORDER — FERROUS SULFATE 325(65) MG
325 TABLET ORAL
Status: DISCONTINUED | OUTPATIENT
Start: 2020-11-17 | End: 2020-11-17 | Stop reason: HOSPADM

## 2020-11-16 RX ORDER — SODIUM CHLORIDE 0.9 % (FLUSH) 0.9 %
10 SYRINGE (ML) INJECTION PRN
Status: DISCONTINUED | OUTPATIENT
Start: 2020-11-16 | End: 2020-11-17 | Stop reason: HOSPADM

## 2020-11-16 RX ORDER — SODIUM CHLORIDE 0.9 % (FLUSH) 0.9 %
10 SYRINGE (ML) INJECTION EVERY 12 HOURS SCHEDULED
Status: DISCONTINUED | OUTPATIENT
Start: 2020-11-16 | End: 2020-11-17 | Stop reason: HOSPADM

## 2020-11-16 RX ORDER — HYDROCORTISONE ACETATE 25 MG/1
25 SUPPOSITORY RECTAL 2 TIMES DAILY PRN
Status: DISCONTINUED | OUTPATIENT
Start: 2020-11-16 | End: 2020-11-17 | Stop reason: HOSPADM

## 2020-11-16 RX ORDER — LIDOCAINE HYDROCHLORIDE 10 MG/ML
30 INJECTION, SOLUTION EPIDURAL; INFILTRATION; INTRACAUDAL; PERINEURAL PRN
Status: DISCONTINUED | OUTPATIENT
Start: 2020-11-16 | End: 2020-11-16

## 2020-11-16 RX ORDER — NALBUPHINE HCL 10 MG/ML
5 AMPUL (ML) INJECTION EVERY 4 HOURS PRN
Status: DISCONTINUED | OUTPATIENT
Start: 2020-11-16 | End: 2020-11-16

## 2020-11-16 RX ORDER — MORPHINE SULFATE 4 MG/ML
4 INJECTION, SOLUTION INTRAMUSCULAR; INTRAVENOUS
Status: DISCONTINUED | OUTPATIENT
Start: 2020-11-16 | End: 2020-11-17 | Stop reason: HOSPADM

## 2020-11-16 RX ORDER — CARBOPROST TROMETHAMINE 250 UG/ML
250 INJECTION, SOLUTION INTRAMUSCULAR PRN
Status: DISCONTINUED | OUTPATIENT
Start: 2020-11-16 | End: 2020-11-16

## 2020-11-16 RX ORDER — ACETAMINOPHEN 325 MG/1
650 TABLET ORAL EVERY 4 HOURS PRN
Status: DISCONTINUED | OUTPATIENT
Start: 2020-11-16 | End: 2020-11-16

## 2020-11-16 RX ORDER — METHYLERGONOVINE MALEATE 0.2 MG/ML
200 INJECTION INTRAVENOUS
Status: ACTIVE | OUTPATIENT
Start: 2020-11-16 | End: 2020-11-16

## 2020-11-16 RX ORDER — IBUPROFEN 800 MG/1
800 TABLET ORAL EVERY 8 HOURS PRN
Status: DISCONTINUED | OUTPATIENT
Start: 2020-11-16 | End: 2020-11-16

## 2020-11-16 RX ORDER — ONDANSETRON 2 MG/ML
4 INJECTION INTRAMUSCULAR; INTRAVENOUS EVERY 6 HOURS PRN
Status: DISCONTINUED | OUTPATIENT
Start: 2020-11-16 | End: 2020-11-16

## 2020-11-16 RX ADMIN — ROPIVACAINE HYDROCHLORIDE 10 ML: 2 INJECTION, SOLUTION EPIDURAL; INFILTRATION at 14:21

## 2020-11-16 RX ADMIN — NIFEDIPINE 30 MG: 10 CAPSULE ORAL at 22:24

## 2020-11-16 RX ADMIN — DIPHENHYDRAMINE HYDROCHLORIDE 25 MG: 50 INJECTION INTRAMUSCULAR; INTRAVENOUS at 18:24

## 2020-11-16 RX ADMIN — Medication 15 ML/HR: at 14:22

## 2020-11-16 RX ADMIN — IBUPROFEN 800 MG: 800 TABLET, FILM COATED ORAL at 21:46

## 2020-11-16 RX ADMIN — HYDROXYZINE PAMOATE 25 MG: 25 CAPSULE ORAL at 20:29

## 2020-11-16 RX ADMIN — Medication 1 MILLI-UNITS/MIN: at 14:28

## 2020-11-16 RX ADMIN — SODIUM CHLORIDE, POTASSIUM CHLORIDE, SODIUM LACTATE AND CALCIUM CHLORIDE: 600; 310; 30; 20 INJECTION, SOLUTION INTRAVENOUS at 12:20

## 2020-11-16 RX ADMIN — SODIUM CHLORIDE, POTASSIUM CHLORIDE, SODIUM LACTATE AND CALCIUM CHLORIDE: 600; 310; 30; 20 INJECTION, SOLUTION INTRAVENOUS at 14:20

## 2020-11-16 ASSESSMENT — PAIN DESCRIPTION - DESCRIPTORS: DESCRIPTORS: CRAMPING

## 2020-11-16 ASSESSMENT — PAIN SCALES - GENERAL
PAINLEVEL_OUTOF10: 0
PAINLEVEL_OUTOF10: 3

## 2020-11-16 NOTE — FLOWSHEET NOTE
Dr. Zoie Chase text messaged to call unit, MD returned call, updated on no pain relief.  MD will be up to evaluate

## 2020-11-16 NOTE — FLOWSHEET NOTE
Dr. Mario Hearn in room, evaluating epidural. States need to replace epidural. Pt voiced understanding.

## 2020-11-16 NOTE — PLAN OF CARE
Problem: Anxiety:  Goal: Level of anxiety will decrease  Description: Level of anxiety will decrease  Outcome: Ongoing  Note: Pt is slightly nervous, RN to provide positive reassurance and education at this time. Problem: Breathing Pattern - Ineffective:  Goal: Able to breathe comfortably  Description: Able to breathe comfortably  Outcome: Ongoing  Note: RN notes easy and even respers on assessment, pt denies being SOB at this time. Problem: Fluid Volume - Imbalance:  Goal: Absence of intrapartum hemorrhage signs and symptoms  Description: Absence of intrapartum hemorrhage signs and symptoms  Outcome: Ongoing  Note: No vaginal bleeding at this time.       Problem: Infection - Intrapartum Infection:  Goal: Will show no infection signs and symptoms  Description: Will show no infection signs and symptoms  Outcome: Ongoing  Note: Currently afebrile     Problem: Labor Process - Prolonged:  Goal: Uterine contractions within specified parameters  Description: Uterine contractions within specified parameters  Outcome: Ongoing  Note: Pitocin induction infusing      Problem:  Screening:  Goal: Ability to make informed decisions regarding treatment has improved  Description: Ability to make informed decisions regarding treatment has improved  Outcome: Ongoing  Note: Pt and FOB able to make informed decisions based on MD and RN education     Problem: Pain - Acute:  Goal: Able to cope with pain  Description: Able to cope with pain  Outcome: Ongoing  Note: Pt comfortable with epidural. Pain goal 6/10     Problem: Tissue Perfusion - Uteroplacental, Altered:  Goal: Absence of abnormal fetal heart rate pattern  Description: Absence of abnormal fetal heart rate pattern  Outcome: Ongoing  Note: Reactive FHT's       Problem: Urinary Retention:  Goal: Urinary elimination within specified parameters  Description: Urinary elimination within specified parameters  Outcome: Ongoing  Note: Bustamante catheter placed, draining large amount clear urine. Problem: Discharge Planning:  Goal: Discharged to appropriate level of care  Description: Discharged to appropriate level of care  Outcome: Ongoing  Note: Pt and  remain in L&D for delivery/recovery and then will transfer to 5AB     Problem: Falls - Risk of:  Goal: Absence of physical injury  Description: Absence of physical injury  Outcome: Ongoing  Note: Pt resting in bed, call light within reach     Problem: Pain:  Goal: Pain level will decrease  Description: Pain level will decrease  Outcome: Completed  Goal: Control of acute pain  Description: Control of acute pain  Outcome: Completed  Goal: Control of chronic pain  Description: Control of chronic pain  Outcome: Completed    Care plan reviewed with patient and FOB. Patient and FOB verbalize understanding of the plan of care and contribute to goal setting.

## 2020-11-16 NOTE — FLOWSHEET NOTE
Dr. Jillian Ortiz at desk, viewing EFM. Updated on rare decel, some early and then ? Late decel around 1452. MD states she is signing out to Dr. Alyssa Mar.  Resume POC

## 2020-11-16 NOTE — ANESTHESIA PROCEDURE NOTES
Epidural Block    Patient location during procedure: OB  Reason for block: labor epidural  Staffing  Anesthesiologist: Nicole Lind MD  Performed: anesthesiologist   Epidural  Patient position: sitting  Prep: ChloraPrep  Patient monitoring: cardiac monitor and continuous pulse ox  Approach: midline  Location: lumbar (1-5)  Injection technique: ROBERTO air  Provider prep: mask and sterile gloves  Needle  Needle gauge: 18 G  Needle length: 3.5 in  Needle insertion depth: 6.5 cm  Catheter type: end hole  Catheter at skin depth: 10 cm  Assessment  Hemodynamics: stable  Attempts: 1        This is redo epidural note.

## 2020-11-16 NOTE — ANESTHESIA PROCEDURE NOTES
Epidural Block    Patient location during procedure: OB  Start time: 11/16/2020 2:05 PM  End time: 11/16/2020 2:25 PM  Reason for block: labor epidural  Staffing  Anesthesiologist: Kailyn Chong MD  Resident/CRNA: ASHKAN Tao CRNA  Performed: anesthesiologist and resident/CRNA   Preanesthetic Checklist  Completed: patient identified, site marked, surgical consent, pre-op evaluation, timeout performed, IV checked, risks and benefits discussed, monitors and equipment checked, anesthesia consent given, oxygen available and patient being monitored  Epidural  Patient position: sitting  Prep: ChloraPrep  Patient monitoring: continuous pulse ox and frequent blood pressure checks  Approach: midline  Location: lumbar (1-5)  Injection technique: ROBERTO saline  Procedures: paresthesia technique  Provider prep: mask and sterile gloves  Needle  Needle type: Tuohy   Needle gauge: 18 G  Needle length: 3.5 in  Needle insertion depth: 8 cm  Catheter type: side hole  Catheter size: 19 G  Catheter at skin depth: 11 cm  Test dose: negative  Assessment  Events: paresthesia  Hemodynamics: stable  Attempts: 1

## 2020-11-16 NOTE — H&P
[address] [place] [address]  History and Physical Update    Pt Name: Samir Ramirez  MRN: [id number]  YOB: 1990  Date of evaluation: 2020    [] I have examined the patient and reviewed the H&P/Consult and there are no changes to the patient or plans. [x] I have examined the patient and reviewed the H&P/Consult and have noted the following changes:    31yo  at 37/2 sent in for IOL due to gestational HTN. Pt is on procardia xl 30mg. Pt in office today with BPs 160-170s/100s. Pt with headache now not reliefed by Tylenol. CE: 3/50/-2. AROM with clear fluid. Cat 1 tracing. Induction via pitocin. GBS neg. Discussion with the patient and/ or family for proposed care, treatment, services; benefits, risks, side effects; likelihood of achieving goals and potential problems that may occur during recuperation was had and all questions were answered. Discussion with the patient and/ or family of reasonable alternatives to the proposed care, treatment, services and the discussion of the risks, benefits, side effects related to the alternatives and the risk related to not receiving the proposed care treatment services was also had and all questions were answered. If this is for an elective surgical procedure then The patient was counseled at length about the risks of jeremiah Covid-19 during their perioperative period and any recovery window from their procedure. The patient was made aware that jeermiah Covid-19  may worsen their prognosis for recovering from their procedure  and lend to a higher morbidity and/or mortality risk. All material risks, benefits, and reasonable alternatives including postponing the procedure were discussed. The patient  does wish to proceed with the procedure at this time.              Divya Arredondo  Electronically signed 2020 at 1:16 PM

## 2020-11-16 NOTE — ANESTHESIA PRE PROCEDURE
Department of Anesthesiology  Preprocedure Note       Name:  Jocelyne Stokes   Age:  27 y.o.  :  1990                                          MRN:  [id number]         Date:  2020      Surgeon: * No surgeons listed *    Procedure: * No procedures listed *    Medications prior to admission:   Prior to Admission medications    Medication Sig Start Date End Date Taking?  Authorizing Provider   NIFEdipine (PROCARDIA) 10 MG capsule Take 30 mg by mouth daily   Yes Historical Provider, MD   Prenatal MV-Min-Fe Fum-FA-DHA (PRENATAL 1 PO) Take 1 tablet by mouth daily   Yes Historical Provider, MD   Poison Ivy Treatments (TECNU OUTDOOR SKIN CLEANSER) LIQD Follow package directions for topical use. 7/15/18   ASHKAN Middleton - CNP   Naproxen Sodium (ALEVE) 220 MG CAPS Take by mouth    Historical Provider, MD       Current medications:    Current Facility-Administered Medications   Medication Dose Route Frequency Provider Last Rate Last Dose    lactated ringers infusion   Intravenous Continuous Esete Alaniz  mL/hr at 20 1420      sodium chloride flush 0.9 % injection 10 mL  10 mL Intravenous 2 times per day Estee Alaniz MD        sodium chloride flush 0.9 % injection 10 mL  10 mL Intravenous PRN Estee Alaniz MD        lidocaine PF 1 % injection 30 mL  30 mL Other PRN Estee Alaniz MD        butorphanol (STADOL) injection 1 mg  1 mg Intravenous Q2H PRN Estee Alaniz MD        nitrous oxide 50% inhalation 1 each  1 each Inhalation Continuous PRN Estee Alaniz MD        acetaminophen (TYLENOL) tablet 650 mg  650 mg Oral Q4H PRN Estee Alaniz MD        ibuprofen (ADVIL;MOTRIN) tablet 800 mg  800 mg Oral Q8H PRN Estee Alaniz MD        oxytocin (PITOCIN) 30 units in 500 mL infusion  1 fam-units/min Intravenous Continuous Estee Alaniz MD        diphenhydrAMINE (BENADRYL) injection 25 mg  25 mg Intravenous Q4H PRN Estee Alaniz MD        docusate sodium (COLACE) capsule 100 mg  100 mg Oral BID PRN Eboni Jordan MD        [facility]dansetron [facility]) injection 8 mg  8 mg Intravenous Q6H PRN Eboni Jordan MD        terbutaline (BRETHINE) injection 0.25 mg  0.25 mg Subcutaneous Once Eboni Jordan MD        oxytocin (PITOCIN) 10 unit bolus from the bag  10 Units Intravenous PRN Eboni Jordan MD        And    oxytocin (PITOCIN) 30 units in 500 mL infusion  95 fam-units/min Intravenous PRN Eboni Jordan MD        methylergonovine (METHERGINE) injection 200 mcg  200 mcg Intramuscular PRN Eboni Jordan MD        carboprost (HEMABATE) injection 250 mcg  250 mcg Intramuscular PRN Eboni Jordan MD        miSOPROStol (CYTOTEC) tablet 1,000 mcg  1,000 mcg Rectal PRN Eboni Nikki, MD        witch hazel-glycerin (TUCKS) pad   Topical PRN Eboni Jordan MD        benzocaine-menthol (DERMOPLAST) 20-0.5 % spray   Topical PRN Eboni Jordan MD        ropivacaine (NAROPIN) 0.2% injection 0.2%                Allergies: Allergies   Allergen Reactions    Codeine Nausea Only       Problem List:    Patient Active Problem List   Diagnosis Code    Mold exposure Z77.120    Dizziness R42    Labor abnormal O62.9    Gestational hypertension, third trimester O13.3       Past Medical History:        Diagnosis Date    ADHD (attention deficit hyperactivity disorder) 0    Anxiety     Depression     Headache(784.0)        Past Surgical History:        Procedure Laterality Date    COLONOSCOPY  [id number]    [facility]  03/08/2013       Social History:    Social History     Tobacco Use    Smoking status: Never Smoker    Smokeless tobacco: Never Used   Substance Use Topics    Alcohol use:  No                                Counseling given: Not Answered      Vital Signs (Current):   Vitals:    11/16/20 1226 11/16/20 1241 11/16/20 1248 11/16/20 1257   BP: 130/88 (!) 136/101 (!) 132/93 132/82   Pulse: 83 92 109 88   Resp:   18    Temp:   36.5 °C (97.7 °F) TempSrc:   Oral                                               BP Readings from Last 3 Encounters:   11/16/20 132/82   11/13/20 119/76   09/17/20 137/88       NPO Status: Time of last liquid consumption: 1000                        Time of last solid consumption: 0800                        Date of last liquid consumption: 11/16/20                        Date of last solid food consumption: 11/16/20    BMI:   Wt Readings from Last 3 Encounters:   09/17/20 183 lb (83 kg)   07/06/18 150 lb (68 kg)   04/10/18 150 lb (68 kg)     There is no height or weight on file to calculate BMI.    CBC:   Lab Results   Component Value Date    WBC 10.4 11/16/2020    RBC 3.90 11/16/2020    RBC 4.02 09/09/2011    HGB 12.0 11/16/2020    HCT 36.2 11/16/2020    MCV 92.8 11/16/2020    RDW 12.9 04/10/2018     11/16/2020       CMP:   Lab Results   Component Value Date     11/16/2020    K 3.6 11/16/2020     11/16/2020    CO2 19 11/16/2020    BUN 5 11/16/2020    CREATININE 0.5 11/16/2020    LABGLOM >90 11/16/2020    GLUCOSE 82 11/16/2020    PROT 6.4 11/16/2020    CALCIUM 9.2 11/16/2020    BILITOT 0.5 11/16/2020    ALKPHOS 128 11/16/2020    AST 15 11/16/2020    ALT 9 11/16/2020       POC Tests: No results for input(s): POCGLU, POCNA, POCK, POCCL, POCBUN, POCHEMO, POCHCT in the last 72 hours.     Coags: No results found for: PROTIME, INR, APTT    HCG (If Applicable):   Lab Results   Component Value Date    PREGTESTUR NEGATIVE 08/04/2011    PREGSERUM NEGATIVE 07/06/2018        ABGs: No results found for: PHART, PO2ART, ELL6YZP, JGS2ZRF, BEART, I0OTCYOG     Type & Screen (If Applicable):  Lab Results   Component Value Date    LABRH POS 11/16/2020       Drug/Infectious Status (If Applicable):  No results found for: HIV, HEPCAB    COVID-19 Screening (If Applicable): No results found for: COVID19      Anesthesia Evaluation  Patient summary reviewed and Nursing notes reviewed no history of anesthetic complications:   Airway: Mallampati: II  TM distance: >3 FB   Neck ROM: full  Mouth opening: > = 3 FB Dental: normal exam         Pulmonary:Negative Pulmonary ROS and normal exam                               Cardiovascular:    (+) hypertension:,                   Neuro/Psych:   (+) headaches:, psychiatric history:            GI/Hepatic/Renal: Neg GI/Hepatic/Renal ROS            Endo/Other: Negative Endo/Other ROS                    Abdominal:           Vascular: negative vascular ROS. Anesthesia Plan      epidural     ASA 2             Anesthetic plan and risks discussed with patient. Plan discussed with attending.                   ASHKAN Blanca - CRNA   11/16/2020

## 2020-11-16 NOTE — PROCEDURES
Department of Obstetrics and Gynecology  Spontaneous Vaginal Delivery Note      Pre-operative Diagnosis:  MATERNITY  Post-operative Diagnosis:  SAME    Information for the patient's : Cordell Mera Dioni Srinivasan [[id number]]                    Infant Wt:   Information for the patient's : Cordell Mera Dioni Srinivasan [[id number]]             APGARS:     Information for the patient's :   Matti Willoughby Girl Dioni Srinivasan [[id number]]             Anesthesia:  EPIDURAL      Delivery Summary:   INDUCTION FOR GEST HTN  AROM  --FEMALE       Estimated blood loss:    300 ML

## 2020-11-16 NOTE — FLOWSHEET NOTE
RN is called to room, pt on left side stating she is in pain and would like an epidural. IV fluid rate increased. [address] Leandro Willoughby CRNA at desk. Viewing labs and allergies. RN states she is bolusing IV fluid at this time. State she will be in [address]  in room to [place] plan of care, pt voiced understanding.  Timeout complete

## 2020-11-16 NOTE — FLOWSHEET NOTE
Dr. Sabra Lopez text messaged multip of Dr. Radha Chinchilla 5-6/100/-1. Will likely be calling soon for delivery.  Returns message ok

## 2020-11-17 VITALS
DIASTOLIC BLOOD PRESSURE: 96 MMHG | TEMPERATURE: 98.2 F | OXYGEN SATURATION: 97 % | SYSTOLIC BLOOD PRESSURE: 144 MMHG | HEART RATE: 89 BPM | RESPIRATION RATE: 16 BRPM

## 2020-11-17 PROCEDURE — 90471 IMMUNIZATION ADMIN: CPT | Performed by: OBSTETRICS & GYNECOLOGY

## 2020-11-17 PROCEDURE — 6360000002 HC RX W HCPCS: Performed by: OBSTETRICS & GYNECOLOGY

## 2020-11-17 PROCEDURE — 6370000000 HC RX 637 (ALT 250 FOR IP): Performed by: OBSTETRICS & GYNECOLOGY

## 2020-11-17 PROCEDURE — 90715 TDAP VACCINE 7 YRS/> IM: CPT | Performed by: OBSTETRICS & GYNECOLOGY

## 2020-11-17 RX ORDER — NIFEDIPINE 30 MG/1
30 TABLET, FILM COATED, EXTENDED RELEASE ORAL DAILY
Qty: 30 TABLET | Refills: 0
Start: 2020-11-17 | End: 2022-07-22

## 2020-11-17 RX ORDER — NIFEDIPINE 30 MG/1
30 TABLET, EXTENDED RELEASE ORAL DAILY
Status: DISCONTINUED | OUTPATIENT
Start: 2020-11-17 | End: 2020-11-17 | Stop reason: HOSPADM

## 2020-11-17 RX ADMIN — IBUPROFEN 800 MG: 800 TABLET, FILM COATED ORAL at 07:27

## 2020-11-17 RX ADMIN — ACETAMINOPHEN 650 MG: 325 TABLET ORAL at 04:00

## 2020-11-17 RX ADMIN — NIFEDIPINE 30 MG: 30 TABLET, FILM COATED, EXTENDED RELEASE ORAL at 08:37

## 2020-11-17 RX ADMIN — DOCUSATE SODIUM 100 MG: 100 CAPSULE, LIQUID FILLED ORAL at 08:37

## 2020-11-17 RX ADMIN — TETANUS TOXOID, REDUCED DIPHTHERIA TOXOID AND ACELLULAR PERTUSSIS VACCINE, ADSORBED 0.5 ML: 5; 2.5; 8; 8; 2.5 SUSPENSION INTRAMUSCULAR at 19:35

## 2020-11-17 RX ADMIN — IBUPROFEN 800 MG: 800 TABLET, FILM COATED ORAL at 16:50

## 2020-11-17 ASSESSMENT — PAIN SCALES - GENERAL
PAINLEVEL_OUTOF10: 3
PAINLEVEL_OUTOF10: 3
PAINLEVEL_OUTOF10: 4
PAINLEVEL_OUTOF10: 1

## 2020-11-17 NOTE — PLAN OF CARE
Problem: Discharge Planning:  Goal: Discharged to appropriate level of care  Description: Discharged to appropriate level of care  Outcome: Ongoing  Note: Discharge not anticipated. Will continue to evaluate for needs. Problem: Constipation:  Goal: Bowel elimination is within specified parameters  Description: Bowel elimination is within specified parameters  Outcome: Ongoing  Note: Colace offered PRN at HS     Problem: Fluid Volume - Imbalance:  Goal: Absence of postpartum hemorrhage signs and symptoms  Description: Absence of postpartum hemorrhage signs and symptoms  Outcome: Ongoing  Note: Vaginal bleeding WNL, no clots or foul odors. \     Problem: Infection - Risk of, Puerperal Infection:  Goal: Will show no infection signs and symptoms  Description: Will show no infection signs and symptoms  Outcome: Ongoing  Note: Vital signs and assessments WNL. Problem: Mood - Altered:  Goal: Mood stable  Description: Mood stable  Outcome: Ongoing  Note: Pt is calm and cooperative. Problem: Pain - Acute:  Goal: Pain level will decrease  Description: Pain level will decrease  Outcome: Ongoing  Note: Pain controlled with po meds. Discussed ice for perineal pain and/or incisional pain or the use of warm blanket/heating pad for uterine cramps. Pt states her pain goal 7/10 has been met. Care plan reviewed with patient and she contributes to goal setting and voices understanding of plan of care.

## 2020-11-17 NOTE — FLOWSHEET NOTE
Dr. Mary Gilliland in dept, updated on pt having a severe /90's, most arer running 140-150/80-90's. Pt is itching uncontrollably. RN already gave Benadryl 25mls IV. MD states to resume POC. Call anesthesia for further orders.

## 2020-11-17 NOTE — FLOWSHEET NOTE
Pt c/o sharp burning/stabing pains in her left leg that are intermittent and sporadically go down her leg. She states feel like she's being bitten. Pt with no rash or visible bites. Pt denies loss of sensation or any other areas of this discomfort. Plan of care discussed and pt given ice pack to apply to leg. Pt declines further needs at this time.

## 2020-11-17 NOTE — FLOWSHEET NOTE
Pt transferred to [phone] with infant daughter in arms, both in stable condition. HEBER Deondre followed with all personal belongings in his possession. Report given to oncoming nurse.

## 2020-11-17 NOTE — ANESTHESIA POSTPROCEDURE EVALUATION
Department of Anesthesiology  Postprocedure Note    Patient: Tiffanie Anne  MRN: [id number]  YOB: 1990  Date of evaluation: 11/17/2020  Time:  8:19 AM     Procedure Summary     Date:  11/16/20 Room / Location:      Anesthesia Start:  8182 Anesthesia Stop:  3543    Procedure:  Labor Analgesia Diagnosis:      Scheduled Providers:   Responsible Provider:  Shirin Hurtado MD    Anesthesia Type:  epidural ASA Status:  2          Anesthesia Type: epidural    Danielle Phase I: Danielle Score: 9    Danielle Phase II: Danielle Score: 10    Last vitals: Reviewed and per EMR flowsheets.        Anesthesia Post Evaluation    Patient location during evaluation: floor  Patient participation: complete - patient participated  Level of consciousness: awake and alert  Airway patency: patent  Nausea & Vomiting: no nausea and no vomiting  Complications: no  Cardiovascular status: hemodynamically stable  Respiratory status: acceptable, room air and spontaneous ventilation  Hydration status: stable Monitor.

## 2020-11-17 NOTE — FLOWSHEET NOTE
Post birth warning signs education paper given and reviewed, teaching complete. [place] postpartum depression screening discussed with patient, instructed to contact her healthcare provider if her score is > 10. Patient voiced understanding. Mother's blood type is O+. Baby's blood type is O+.   Mother did not receive Rhogam.

## 2020-11-17 NOTE — PROGRESS NOTES
Department of Obstetrics and Gynecology  Labor and Delivery  Attending Post Partum Progress Note    PPD #1    SUBJECTIVE: Feeling well. No complaints. OBJECTIVE:     Vitals:  /77   Pulse 101   Temp 98.3 °F (36.8 °C) (Oral)   Resp 16   SpO2 97%   Breastfeeding Unknown     Uterus:  normal size, well involuted, firm, non-tender    DATA:    Hemoglobin:   Lab Results   Component Value Date    HGB 12.0 11/16/2020       ASSESSMENT & PLAN:  Doing well. Chronic htn- on procardia xl 30mg.  BPs well controlled through the night  Pt requesting home today if possible    Rabia Freed 11/17/2020

## 2020-11-17 NOTE — LACTATION NOTE
Provided and discussed breastfeeding booklet with pt. Encouraged pt. To call lactation for assistance. Pt. Stated she has a breastfeeding booklet. Encouraged pt. To burp infant before feeds. Pt. Stated she has a breast pump at home. Will continue to follow up with pt. PRN.

## 2020-11-17 NOTE — OP NOTE
[address] [place] [address]                                OPERATIVE REPORT    PATIENT NAME: Manoj Pickett                :        1990  MED REC NO:   [id number]                           ROOM:       0014  ACCOUNT NO:   [de-identified]                           ADMIT DATE: 2020  PROVIDER:     Jhonatan Yanes M.D.    DATE OF PROCEDURE:  2020    DELIVERY SUMMARY:  The patient was admitted by Dr. Kilo Hernandez for induction  of labor at 37 weeks 2 days' gestation due to gestational hypertension. She was being treated with nifedipine. She underwent intravenous  Pitocin induction of labor along with amniotomy for clear fluid,  received an epidural, progressed to complete, was placed in  dorsolithotomy position and experienced a spontaneous vaginal delivery  of a viable infant female over an intact perineum. The infant was  suctioned of nasal and oropharynx only if needed, was fully delivered,  placed on the mother's abdomen, and after delayed cord clamping, the  cord was clamped and cut and the infant removed from the operative  field. Apgars and birthweight are pending at the time of this  dictation. Segment of cord obtained for protocol directed studies. Routine cord blood studies obtained as well. Placenta delivered  spontaneously and intact without difficulty. Cervix, vagina, perineum,  vulva all explored for any lacerations and none were found. Mother,  father, and  infant daughter remained in the birthing room in  good condition.  ML      Leonel Mata M.D.    D: 2020 17:54:07       T: 2020 17:57:40     [email or web address]  Job#: [id number]     Doc#: [id number]    CC:   Jhonatan Yanes M.D.

## 2020-11-17 NOTE — FLOWSHEET NOTE
Pt ambulated to bathroom with 1 assist. Voided without issues. Sridevi bottle used. Fresh pad and mesh undies applied. Pt tolerated well. Ambulated to wheelchair.

## 2020-11-17 NOTE — PLAN OF CARE
Problem: Discharge Planning:  Goal: Discharged to appropriate level of care  Description: Discharged to appropriate level of care  11/17/2020 1008 by Lorne Howell RN  Outcome: Ongoing  Note: Discharge planning continues     Problem: Constipation:  Goal: Bowel elimination is within specified parameters  Description: Bowel elimination is within specified parameters  11/17/2020 1008 by Lorne Howell RN  Outcome: Ongoing  Note: No bM passing gas, colace given     Problem: Fluid Volume - Imbalance:  Goal: Absence of postpartum hemorrhage signs and symptoms  Description: Absence of postpartum hemorrhage signs and symptoms  11/17/2020 1008 by Lorne Howell RN  Outcome: Ongoing     Problem: Infection - Risk of, Puerperal Infection:  Goal: Will show no infection signs and symptoms  Description: Will show no infection signs and symptoms  11/17/2020 1008 by Lorne Howell RN  Outcome: Ongoing  Note: Vitals stable, no s/sx of infection     Problem: Mood - Altered:  Goal: Mood stable  Description: Mood stable  11/17/2020 1008 by Lorne Howell RN  Outcome: Ongoing  Note: Stable mood expresses needs     Problem: Pain - Acute:  Goal: Pain level will decrease  Description: Pain level will decrease  11/17/2020 1008 by Lorne Howell RN  Outcome: Ongoing  Note: Pain controlled well with Motrin, tylenol, rest, repositioning, pain goal 7   Care plan reviewed with patient and SO. Patient and SO verbalize understanding of the plan of care and contribute to goal setting.

## 2020-11-17 NOTE — DISCHARGE SUMMARY
Vaginal Delivery Discharge Summary    Gestational Age:37w2d    Antepartum complications: Chronic HTN    Admission date: 2020 12:07 PM      Type of Delivery:      [place] Data  Information for the patient's : Yesy Vick [[id number]]   female   Birth Weight: 7 lb 0.9 oz (3.2 kg)       Labs: CBC   Lab Results   Component Value Date    HGB 12.0 2020    HCT 36.2 (L) 2020        Intrapartum complications: None    Postpartum complications: none    The patient is ambulating well. The patient is tolerating a normal diet. Patient Instructions: Activity: activity as tolerated and no sex for 6 weeks  Diet: regular  Wound Care: as directed    Discharge Information  Current Discharge Medication List      START taking these medications    Details   NIFEdipine (ADALAT CC) 30 MG extended release tablet Take 1 tablet by mouth daily  Qty: 30 tablet, Refills: 0         CONTINUE these medications which have NOT CHANGED    Details   Prenatal MV-Min-Fe Fum-FA-DHA (PRENATAL 1 PO) Take 1 tablet by mouth daily      Poison Ivy Treatments (TECNU OUTDOOR SKIN CLEANSER) LIQD Follow package directions for topical use. Refills: 0      Naproxen Sodium (ALEVE) 220 MG CAPS Take by mouth         STOP taking these medications       NIFEdipine (PROCARDIA) 10 MG capsule Comments:   Reason for Stopping:               No discharge procedures on file.     Condition: Good    Plan:   Follow up in 1 week(s)    Electronically signed by Lissette Lara MD on 2020 at 7:31 AM

## 2020-11-18 NOTE — PLAN OF CARE
Problem: Discharge Planning:  Goal: Discharged to appropriate level of care  Description: Discharged to appropriate level of care  11/17/2020 1944 by Maine Cole RN  Outcome: Completed  Note: Home today      Problem: Constipation:  Goal: Bowel elimination is within specified parameters  Description: Bowel elimination is within specified parameters  11/17/2020 1944 by Maine Cole RN  Outcome: Completed  Note: Encouraged fluids, high fiber meals, and ambulation if needed      Problem: Fluid Volume - Imbalance:  Goal: Absence of postpartum hemorrhage signs and symptoms  Description: Absence of postpartum hemorrhage signs and symptoms  11/17/2020 1944 by Maine Cole RN  Outcome: Completed  Note: Small amount of lochia on pad, no clots noted       Problem: Infection - Risk of, Puerperal Infection:  Goal: Will show no infection signs and symptoms  Description: Will show no infection signs and symptoms  11/17/2020 1944 by Maine Cole RN  Outcome: Completed     Problem: Mood - Altered:  Goal: Mood stable  Description: Mood stable  11/17/2020 1944 by Maine Cole RN  Outcome: Completed  Note: Calm and cooperative, bonding well with infant       Problem: Pain - Acute:  Goal: Pain level will decrease  Description: Pain level will decrease  11/17/2020 1944 by Maine Cole RN  Outcome: Completed  Note: Pain controlled with po medications. Pain goal 4 out of 10. Pt using rest and repositioning for pain. Care plan reviewed with patient. Patient verbalizes understanding of the plan of care and contributes to goal setting.

## 2020-11-18 NOTE — FLOWSHEET NOTE
Discharge teaching and instructions  completed with patient using teachback method. AVS reviewed. Patient voiced understanding regarding follow up appointments, and care of self at home. Discharged in stable condition.

## 2020-12-21 NOTE — PROGRESS NOTES
[address] [place] [address]                                NON STRESS TEST    PATIENT NAME: Reg Euceda                :        1990  MED REC NO:   [id number]                           ROOM:       0003  ACCOUNT NO:   [de-identified]                           ADMIT DATE: 2020  PROVIDER:     Spurgeon Harada, M.D.    DATE OF STUDY:  2020    This is a 80-year-old  5, para 4, at 39 and 6, coming in with  elevated blood pressures and headache. Fetal heart tones are in the  150s, moderate variability, positive accelerations. TOCO none. She had  a reactive NST.         Naina Sinha M.D.    D: 2020 10:01:36       T: 2020 1:45:48     [email or web address]  Job#: [id number]     Doc#: [id number]    CC:

## 2022-07-22 ENCOUNTER — HOSPITAL ENCOUNTER (EMERGENCY)
Age: 32
Discharge: HOME | End: 2022-07-22
Payer: COMMERCIAL

## 2022-07-22 VITALS
SYSTOLIC BLOOD PRESSURE: 137 MMHG | TEMPERATURE: 98.4 F | BODY MASS INDEX: 25.9 KG/M2 | HEART RATE: 67 BPM | HEIGHT: 67 IN | WEIGHT: 165 LBS | OXYGEN SATURATION: 99 % | DIASTOLIC BLOOD PRESSURE: 100 MMHG | RESPIRATION RATE: 16 BRPM

## 2022-07-22 DIAGNOSIS — T63.441A BEE STING, ACCIDENTAL OR UNINTENTIONAL, INITIAL ENCOUNTER: Primary | ICD-10-CM

## 2022-07-22 PROCEDURE — 96372 THER/PROPH/DIAG INJ SC/IM: CPT

## 2022-07-22 PROCEDURE — 6360000002 HC RX W HCPCS: Performed by: NURSE PRACTITIONER

## 2022-07-22 PROCEDURE — 99213 OFFICE O/P EST LOW 20 MIN: CPT

## 2022-07-22 RX ORDER — METHYLPREDNISOLONE SODIUM SUCCINATE 40 MG/ML
40 INJECTION, POWDER, LYOPHILIZED, FOR SOLUTION INTRAMUSCULAR; INTRAVENOUS ONCE
Status: COMPLETED | OUTPATIENT
Start: 2022-07-22 | End: 2022-07-22

## 2022-07-22 RX ADMIN — METHYLPREDNISOLONE SODIUM SUCCINATE 40 MG: 40 INJECTION, POWDER, FOR SOLUTION INTRAMUSCULAR; INTRAVENOUS at 16:24

## 2022-07-22 ASSESSMENT — PAIN DESCRIPTION - ORIENTATION: ORIENTATION: RIGHT;LOWER

## 2022-07-22 ASSESSMENT — PAIN DESCRIPTION - LOCATION: LOCATION: LEG

## 2022-07-22 ASSESSMENT — PAIN SCALES - GENERAL: PAINLEVEL_OUTOF10: 7

## 2022-07-22 ASSESSMENT — PAIN DESCRIPTION - DESCRIPTORS: DESCRIPTORS: BURNING;ITCHING;SHOOTING

## 2022-07-22 ASSESSMENT — ENCOUNTER SYMPTOMS
CHEST TIGHTNESS: 0
SHORTNESS OF BREATH: 0
FACIAL SWELLING: 0
NAUSEA: 0
WHEEZING: 0
VOMITING: 0

## 2022-07-22 ASSESSMENT — PAIN DESCRIPTION - PAIN TYPE: TYPE: ACUTE PAIN

## 2022-07-22 ASSESSMENT — PAIN - FUNCTIONAL ASSESSMENT: PAIN_FUNCTIONAL_ASSESSMENT: 0-10

## 2022-07-22 ASSESSMENT — PAIN DESCRIPTION - FREQUENCY: FREQUENCY: CONTINUOUS

## 2022-07-22 NOTE — ED TRIAGE NOTES
Patient c/o bee stings right lower leg today. Possibly 4 bites. Redness, swelling noted. Ambulated to room 8, on own crutches.

## 2022-07-22 NOTE — ED NOTES
Rechecked Bp after injection. Was slightly elevated from initial assessment. Advised pt to follow up with family  Monday and  find out what last three BPs have been running since pt has a hx of  hypertension with pregnancy. Verbalized understanding. Left in stable condition per self with daughter.      Pearl Fay, PIPPA  08/17/42 9238

## 2022-07-22 NOTE — ED PROVIDER NOTES
[facility]       CHIEF COMPLAINT       Chief Complaint   Patient presents with    Insect Bite     Rt. Lower leg       Nurses Notes reviewed and I agree except as noted in the HPI. HISTORY OF PRESENT ILLNESS   Rose Bailey is a 28 y.o. female who presents     Present in urgent care today with concerns of bee sting or insect bite to right lower leg that occurred earlier today, several hours ago. She denies any tightness to throat, or any wheezing since incident. She is currently utilizing crutches, she states that swelling has caused large amount of pain. Denies any numbness or tingling to right foot. No current open wounds or drainage from site. REVIEW OF SYSTEMS     Review of Systems   Constitutional:  Negative for chills, fatigue and fever. HENT:  Negative for facial swelling and mouth sores. Respiratory:  Negative for chest tightness, shortness of breath and wheezing. Cardiovascular:  Positive for leg swelling (Moderate swelling to right lower leg, near insect bite). Negative for palpitations. Skin:  Negative for rash. Neurological:  Negative for dizziness, weakness, light-headedness and headaches. PAST MEDICAL HISTORY         Diagnosis Date    ADHD (attention deficit hyperactivity disorder) 1997    Anxiety     Depression     Headache(784.0)        SURGICALHISTORY     Patient  has a past surgical history that includes LEEP (03/08/2013) and Colonoscopy ([id number]). CURRENT MEDICATIONS       Discharge Medication List as of 7/22/2022  4:22 PM        CONTINUE these medications which have NOT CHANGED    Details   Naproxen Sodium 220 MG CAPS Take by mouthHistorical Med             ALLERGIES     Patient is is allergic to codeine.     Patients   Immunization History   Administered Date(s) Administered    Tdap (Boostrix, Adacel) 11/17/2020       FAMILY HISTORY     Patient's family history includes Anxiety Disorder in her mother; Cancer in her father; High Blood Pressure in her father. SOCIAL HISTORY     Patient  reports that she has never smoked. She has never used smokeless tobacco. She reports that she does not drink alcohol and does not use drugs. PHYSICAL EXAM     ED TRIAGE VITALS  BP: (!) 137/100, Temp: 98.4 °F (36.9 °C), Heart Rate: 67, Resp: 16, SpO2: 99 %,Estimated body mass index is 25.84 kg/m² as calculated from the following:    Height as of this encounter: 5' 7\" (1.702 m). Weight as of this encounter: 165 lb (74.8 kg). ,Patient's last menstrual period was 07/22/2022. Physical Exam  Constitutional:       General: She is not in acute distress. Appearance: Normal appearance. She is normal weight. She is not ill-appearing or toxic-appearing. HENT:      Nose: Nose normal. No congestion or rhinorrhea. Mouth/Throat:      Mouth: Mucous membranes are moist.      Pharynx: No oropharyngeal exudate or posterior oropharyngeal erythema. Cardiovascular:      Rate and Rhythm: Normal rate. Pulses: Normal pulses. Heart sounds: Normal heart sounds. Pulmonary:      Effort: Pulmonary effort is normal. No respiratory distress. Breath sounds: No wheezing or rhonchi. Musculoskeletal:         General: Swelling (Right lower leg, several bee sting) and tenderness (Right lower leg, several bee stings) present. No deformity. Normal range of motion. Skin:     General: Skin is warm. Capillary Refill: Capillary refill takes less than 2 seconds. Findings: Erythema (Mild erythema to point of tenderness of right lower leg) present. No rash. Neurological:      General: No focal deficit present. Mental Status: She is alert and oriented to person, place, and time. Motor: No weakness. Psychiatric:         Mood and Affect: Mood normal.         Behavior: Behavior normal.         Thought Content:  Thought content normal.         Judgment: Judgment normal.       DIAGNOSTIC RESULTS     Labs:No results found for this visit on 07/22/22. IMAGING:    No orders to display     URGENT CARE COURSE:     Vitals:    07/22/22 1558 07/22/22 1640   BP: (!) 137/96 (!) 137/100   Pulse: 67 67   Resp: 14 16   Temp: 98 °F (36.7 °C) 98.4 °F (36.9 °C)   TempSrc: Temporal Temporal   SpO2: 98% 99%   Weight: 165 lb (74.8 kg)    Height: 5' 7\" (1.702 m)        Medications   methylPREDNISolone sodium (SOLU-MEDROL) injection 40 mg (40 mg IntraMUSCular Given 7/22/22 1624)            PROCEDURES:  None    FINAL IMPRESSION      1. Bee sting, accidental or unintentional, initial encounter          DISPOSITION/ PLAN   Discharged home with instructions to utilize over-the-counter Benadryl and Pepcid for any worsening swelling or irritation to right lower leg. Patient was given Solu-Medrol injection to help improve swelling and tenderness. Patient advised to go to the ER if she does develop any chest tightness, or throat swelling.         PATIENT REFERRED TO:  ASHKAN Eugene CNP  [address] [place] [address]      DISCHARGE MEDICATIONS:  Discharge Medication List as of 7/22/2022  4:22 PM          Discharge Medication List as of 7/22/2022  4:22 PM        STOP taking these medications       NIFEdipine (ADALAT CC) 30 MG extended release tablet Comments:   Reason for Stopping:         Prenatal MV-Min-Fe Fum-FA-DHA (PRENATAL 1 PO) Comments:   Reason for Stopping:         Poison Ivy Treatments (Brisas 2117) LIQD Comments:   Reason for Stopping:               Discharge Medication List as of 7/22/2022  4:22 PM          ASHKAN Sprague NP    (Please note that portions of this note were completed with a voice recognition program. Efforts were made to edit the dictations but occasionally words are mis-transcribed.)          ASHKAN Parisi NP  07/22/22 1069

## 2022-07-22 NOTE — ED PROVIDER NOTES
[address] [facility] Encounter      CHIEFCOMPLAINT       Chief Complaint   Patient presents with    Insect Bite     Rt. Lower leg       Nurses Notes reviewed and I agree except as noted in the HPI. HISTORY OF PRESENT ILLNESS   Jeyson Gaspar is a 28 y.o. female who presents for bee stings. Has had a bee sting before in the past, was on the left hand and hand swelled up. Was stung multiple times this morning on the right lower leg. Rates the pain currently as a 7 or 8/10. Sharp, throbbing, burning pain. Has not taken or applied any medication. REVIEW OF SYSTEMS     Review of Systems   Constitutional:  Negative for chills and fever. Respiratory:  Negative for chest tightness, shortness of breath and wheezing. Cardiovascular:  Negative for chest pain and palpitations. Gastrointestinal:  Negative for nausea and vomiting. Neurological:  Positive for dizziness and light-headedness. PAST MEDICAL HISTORY         Diagnosis Date    ADHD (attention deficit hyperactivity disorder) 1997    Anxiety     Depression     Headache(784.0)        SURGICAL HISTORY     Patient  has a past surgical history that includes LEEP (03/08/2013) and Colonoscopy ([id number]). CURRENT MEDICATIONS       Previous Medications    NAPROXEN SODIUM 220 MG CAPS    Take by mouth       ALLERGIES     Patient is is allergic to codeine. FAMILY HISTORY     Patient'sfamily history includes Anxiety Disorder in her mother; Cancer in her father; High Blood Pressure in her father. SOCIAL HISTORY     Patient  reports that she has never smoked. She has never used smokeless tobacco. She reports that she does not drink alcohol and does not use drugs. PHYSICAL EXAM     ED TRIAGE VITALS  BP: (!) 137/100, Temp: 98.4 °F (36.9 °C), Heart Rate: 67, Resp: 16, SpO2: 99 %  Physical Exam  Constitutional:       Appearance: Normal appearance. HENT:      Head: Normocephalic and atraumatic.       Nose: Nose normal. Discharge Medication List          Fabian Braun 50, DO  Resident  07/22/22 0012

## 2023-04-13 ENCOUNTER — HOSPITAL ENCOUNTER (EMERGENCY)
Age: 33
Discharge: HOME | End: 2023-04-13
Payer: COMMERCIAL

## 2023-04-13 VITALS
HEIGHT: 67 IN | TEMPERATURE: 97.7 F | SYSTOLIC BLOOD PRESSURE: 166 MMHG | WEIGHT: 165 LBS | OXYGEN SATURATION: 98 % | RESPIRATION RATE: 16 BRPM | BODY MASS INDEX: 25.9 KG/M2 | DIASTOLIC BLOOD PRESSURE: 112 MMHG | HEART RATE: 84 BPM

## 2023-04-13 DIAGNOSIS — H81.10 BENIGN PAROXYSMAL POSITIONAL VERTIGO, UNSPECIFIED LATERALITY: Primary | ICD-10-CM

## 2023-04-13 DIAGNOSIS — I10 PRIMARY HYPERTENSION: ICD-10-CM

## 2023-04-13 PROCEDURE — 99214 OFFICE O/P EST MOD 30 MIN: CPT | Performed by: NURSE PRACTITIONER

## 2023-04-13 PROCEDURE — 99213 OFFICE O/P EST LOW 20 MIN: CPT

## 2023-04-13 RX ORDER — METOPROLOL SUCCINATE 25 MG/1
25 TABLET, EXTENDED RELEASE ORAL DAILY
Qty: 30 TABLET | Refills: 0 | Status: SHIPPED | OUTPATIENT
Start: 2023-04-13

## 2023-04-13 RX ORDER — LISINOPRIL 10 MG/1
10 TABLET ORAL DAILY
Qty: 30 TABLET | Refills: 0 | Status: SHIPPED | OUTPATIENT
Start: 2023-04-13 | End: 2023-04-13

## 2023-04-13 RX ORDER — MECLIZINE HYDROCHLORIDE 25 MG/1
25 TABLET ORAL 3 TIMES DAILY PRN
Qty: 30 TABLET | Refills: 0 | Status: SHIPPED | OUTPATIENT
Start: 2023-04-13 | End: 2023-04-23

## 2023-04-13 ASSESSMENT — PAIN DESCRIPTION - PAIN TYPE: TYPE: ACUTE PAIN

## 2023-04-13 ASSESSMENT — PAIN DESCRIPTION - ONSET: ONSET: AWAKENED FROM SLEEP

## 2023-04-13 ASSESSMENT — PAIN DESCRIPTION - DESCRIPTORS: DESCRIPTORS: PRESSURE

## 2023-04-13 ASSESSMENT — ENCOUNTER SYMPTOMS
COUGH: 0
VOMITING: 0
RHINORRHEA: 0
EYE PAIN: 0
PHOTOPHOBIA: 0
SORE THROAT: 0
SHORTNESS OF BREATH: 0
NAUSEA: 1

## 2023-04-13 ASSESSMENT — PAIN - FUNCTIONAL ASSESSMENT: PAIN_FUNCTIONAL_ASSESSMENT: 0-10

## 2023-04-13 ASSESSMENT — PAIN DESCRIPTION - ORIENTATION: ORIENTATION: LEFT;POSTERIOR

## 2023-04-13 ASSESSMENT — PAIN DESCRIPTION - FREQUENCY: FREQUENCY: CONTINUOUS

## 2023-04-13 ASSESSMENT — PAIN DESCRIPTION - LOCATION: LOCATION: HEAD

## 2023-04-13 NOTE — ED NOTES
Pt presents to the [facility] for c/o headache, nausea, dizziness, and blurred vision that started around 0630 today     Oren Layton LPN  95/23/78 1934

## 2023-04-13 NOTE — ED PROVIDER NOTES
04/13/23 0852   BP: (!) 166/112   Pulse: 84   Resp: 16   Temp: 97.7 °F (36.5 °C)   TempSrc: Temporal   SpO2: 98%   Weight: 165 lb (74.8 kg)   Height: 5' 7\" (1.702 m)       Medications - No data to display       PROCEDURES:  None    FINAL IMPRESSION      1. Benign paroxysmal positional vertigo, unspecified laterality    2. Primary hypertension      DISPOSITION/ PLAN   DISPOSITION Decision To Discharge 04/13/2023 09:11:39 AM     Clinical exam revealed a positive Epley maneuver with nystagmus present. Discussed Epley and Cawthorne maneuvers with patient in depth. Will start on meclizine 3 times a day as needed to control nausea and dizziness. Advised to perform exercises as discussed. Patient's blood pressure was also elevated at 166/112 with complaints of a headache. Discussed treatment of high blood pressure and long-term effects with patient. At this time we will start patient on Toprol-XL. Advised to follow-up with family medicine residency clinic in 1 week for reevaluation and further management. Patient voiced understanding was agreeable to above-mentioned plan. Patient was discharged in stable condition. PATIENT REFERRED TO:  No primary care provider on file. No primary physician on file.       DISCHARGE MEDICATIONS:  New Prescriptions    LISINOPRIL (PRINIVIL;ZESTRIL) 10 MG TABLET    Take 1 tablet by mouth daily    MECLIZINE (ANTIVERT) 25 MG TABLET    Take 1 tablet by mouth 3 times daily as needed for Nausea or Dizziness       Discontinued Medications    No medications on file       Current Discharge Medication List          ASHKAN Diego CNP    (Please note that portions of this note were completed with a voice recognition program. Efforts were made to edit the dictations but occasionally words are mis-transcribed.)            ASHKAN Diego CNP  04/13/23 6492

## 2023-04-24 RX ORDER — OXYCODONE HYDROCHLORIDE AND ACETAMINOPHEN 5; 325 MG/1; MG/1
TABLET ORAL
COMMUNITY
Start: 2019-10-11 | End: 2023-04-25

## 2023-04-24 RX ORDER — IBUPROFEN 800 MG/1
TABLET ORAL
COMMUNITY
Start: 2019-10-11 | End: 2023-04-25

## 2023-04-25 ENCOUNTER — OFFICE VISIT (OUTPATIENT)
Dept: FAMILY MEDICINE CLINIC | Age: 33
End: 2023-04-25
Payer: COMMERCIAL

## 2023-04-25 VITALS
OXYGEN SATURATION: 97 % | DIASTOLIC BLOOD PRESSURE: 92 MMHG | SYSTOLIC BLOOD PRESSURE: 132 MMHG | RESPIRATION RATE: 20 BRPM | BODY MASS INDEX: 25.52 KG/M2 | HEIGHT: 67 IN | WEIGHT: 162.6 LBS | HEART RATE: 87 BPM | TEMPERATURE: 98.1 F

## 2023-04-25 DIAGNOSIS — J30.2 SEASONAL ALLERGIES: ICD-10-CM

## 2023-04-25 DIAGNOSIS — H81.10 BENIGN PAROXYSMAL POSITIONAL VERTIGO, UNSPECIFIED LATERALITY: ICD-10-CM

## 2023-04-25 DIAGNOSIS — I10 PRIMARY HYPERTENSION: Primary | ICD-10-CM

## 2023-04-25 DIAGNOSIS — Z13.220 LIPID SCREENING: ICD-10-CM

## 2023-04-25 PROCEDURE — G8419 CALC BMI OUT NRM PARAM NOF/U: HCPCS | Performed by: STUDENT IN AN ORGANIZED HEALTH CARE EDUCATION/TRAINING PROGRAM

## 2023-04-25 PROCEDURE — 1036F TOBACCO NON-USER: CPT | Performed by: STUDENT IN AN ORGANIZED HEALTH CARE EDUCATION/TRAINING PROGRAM

## 2023-04-25 PROCEDURE — 3075F SYST BP GE 130 - 139MM HG: CPT | Performed by: STUDENT IN AN ORGANIZED HEALTH CARE EDUCATION/TRAINING PROGRAM

## 2023-04-25 PROCEDURE — 3080F DIAST BP >= 90 MM HG: CPT | Performed by: STUDENT IN AN ORGANIZED HEALTH CARE EDUCATION/TRAINING PROGRAM

## 2023-04-25 PROCEDURE — 99214 OFFICE O/P EST MOD 30 MIN: CPT | Performed by: STUDENT IN AN ORGANIZED HEALTH CARE EDUCATION/TRAINING PROGRAM

## 2023-04-25 PROCEDURE — G8427 DOCREV CUR MEDS BY ELIG CLIN: HCPCS | Performed by: STUDENT IN AN ORGANIZED HEALTH CARE EDUCATION/TRAINING PROGRAM

## 2023-04-25 RX ORDER — FLUTICASONE PROPIONATE 50 MCG
2 SPRAY, SUSPENSION (ML) NASAL DAILY
Qty: 16 G | Refills: 0 | Status: SHIPPED | OUTPATIENT
Start: 2023-04-25

## 2023-04-25 RX ORDER — HYDROCHLOROTHIAZIDE 12.5 MG/1
12.5 CAPSULE, GELATIN COATED ORAL EVERY MORNING
Qty: 30 CAPSULE | Refills: 5 | Status: SHIPPED | OUTPATIENT
Start: 2023-04-25

## 2023-04-25 RX ORDER — CETIRIZINE HYDROCHLORIDE 10 MG/1
10 TABLET ORAL DAILY
Qty: 30 TABLET | Refills: 0 | Status: SHIPPED | OUTPATIENT
Start: 2023-04-25 | End: 2023-05-25

## 2023-04-25 SDOH — ECONOMIC STABILITY: INCOME INSECURITY: HOW HARD IS IT FOR YOU TO PAY FOR THE VERY BASICS LIKE FOOD, HOUSING, MEDICAL CARE, AND HEATING?: NOT VERY HARD

## 2023-04-25 SDOH — ECONOMIC STABILITY: FOOD INSECURITY: WITHIN THE PAST 12 MONTHS, THE FOOD YOU BOUGHT JUST DIDN'T LAST AND YOU DIDN'T HAVE MONEY TO GET MORE.: NEVER TRUE

## 2023-04-25 SDOH — ECONOMIC STABILITY: FOOD INSECURITY: WITHIN THE PAST 12 MONTHS, YOU WORRIED THAT YOUR FOOD WOULD RUN OUT BEFORE YOU GOT MONEY TO BUY MORE.: NEVER TRUE

## 2023-04-25 SDOH — ECONOMIC STABILITY: HOUSING INSECURITY
IN THE LAST 12 MONTHS, WAS THERE A TIME WHEN YOU DID NOT HAVE A STEADY PLACE TO SLEEP OR SLEPT IN A SHELTER (INCLUDING NOW)?: NO

## 2023-04-25 ASSESSMENT — PATIENT HEALTH QUESTIONNAIRE - PHQ9
2. FEELING DOWN, DEPRESSED OR HOPELESS: 0
SUM OF ALL RESPONSES TO PHQ9 QUESTIONS 1 & 2: 0
SUM OF ALL RESPONSES TO PHQ QUESTIONS 1-9: 0
1. LITTLE INTEREST OR PLEASURE IN DOING THINGS: 0
SUM OF ALL RESPONSES TO PHQ QUESTIONS 1-9: 0

## 2023-04-25 ASSESSMENT — ENCOUNTER SYMPTOMS
COUGH: 0
EYE ITCHING: 1
VOMITING: 0
RHINORRHEA: 1
NAUSEA: 0
CONSTIPATION: 0
DIARRHEA: 0
SHORTNESS OF BREATH: 0

## 2023-04-25 NOTE — PROGRESS NOTES
[address] [facility] [address] [address]  Dept: [phone]  Loc: [phone]      Please see Resident note for complete HPI.  follow up for BPPV. Symptoms are currently resolved. She was also started on metoprolol for elevated BP as well. ROS per Resident    Lab Results   Component Value Date    WBC 10.4 11/16/2020    HGB 12.0 11/16/2020    HCT 36.2 (L) 11/16/2020    MCV 92.8 11/16/2020     11/16/2020     Lab Results   Component Value Date     11/16/2020    K 3.6 11/16/2020     11/16/2020    CO2 19 (L) 11/16/2020    BUN 5 (L) 11/16/2020    CREATININE 0.5 11/16/2020    GLUCOSE 82 11/16/2020    CALCIUM 9.2 11/16/2020    PROT 6.4 11/16/2020    LABALBU 3.7 11/16/2020    BILITOT 0.5 11/16/2020    ALKPHOS 128 (H) 11/16/2020    AST 15 11/16/2020    ALT 9 (L) 11/16/2020    LABGLOM >90 11/16/2020     No results found for: LABA1C  No results found for: EAG  No results found for: LABMICR, [id number]  Lab Results   Component Value Date    TSH 0.739 04/10/2018     No results found for: CHOL  No results found for: TRIG  No results found for: HDL  No results found for: LDLCHOLESTEROL, LDLCALC  No results found for: LABVLDL, VLDL  No results found for: CHOLHDLRATIO  No results found for: PSA, PSADIA    Health Maintenance Due   Topic Date Due    COVID-19 Vaccine (1) Never done    Depression Screen  Never done    Cervical cancer screen  Never done         Physical Exam per Resident       ICD-10-CM    1. Primary hypertension  I10       2. Benign paroxysmal positional vertigo, unspecified laterality  H81.10               Plan  I participated in the discussion and care of this patient   - Continue meclizine for vertigo  - Get labs   - Switch metoprolol to HCTZ  - Pap in the future.    - Flonase and zyrtec for allergies

## 2023-04-25 NOTE — PROGRESS NOTES
S: 28 y.o. female with   Chief Complaint   Patient presents with    Dizziness     Pt states that the dizziness and blurred vision has been doing better with the medication that the ER physician prescribed. Chief complaint, [place], and all pertinent details of the case reviewed with the resident. Please see resident's note for specific details discussed at today's visit. Symtpoms have resolved that she had in . BP Readings from Last 3 Encounters:   04/25/23 (!) 132/92   04/13/23 (!) 166/112   07/22/22 (!) 137/100     Wt Readings from Last 3 Encounters:   04/25/23 162 lb 9.6 oz (73.8 kg)   04/13/23 165 lb (74.8 kg)   07/22/22 165 lb (74.8 kg)       O: VS:  height is 5' 7\" (1.702 m) and weight is 162 lb 9.6 oz (73.8 kg). Her temperature is 98.1 °F (36.7 °C). Her blood pressure is 132/92 (abnormal) and her pulse is 87. Her respiration is 20 and oxygen saturation is 97%. AAO/NAD, appropriate affect for mood       Diagnosis Orders   1. Primary hypertension  Comprehensive Metabolic Panel    TSH With Reflex Ft4    CBC with Auto Differential    hydroCHLOROthiazide (MICROZIDE) 12.5 MG capsule      2. Benign paroxysmal positional vertigo, unspecified laterality        3. Lipid screening  Lipid Panel      4. Seasonal allergies  fluticasone (FLONASE) 50 MCG/ACT nasal spray    cetirizine (ZYRTEC) 10 MG tablet          Plan: Will stop metoprolol and start HCTZ 12.5 mg, encouraged to eat potassium rich foods  Labs: CMP,TSH,T4,Lipids  Flonase and zyrtec for seasonal allergies  F/u in 1 month    Health Maintenance Due   Topic Date Due    COVID-19 Vaccine (1) Never done    Depression Screen  Never done    Cervical cancer screen  Never done       Attending Physician Statement  I have discussed the case, including pertinent history and exam findings with the resident. I also have seen the patient and performed key portions of the examination.   I agree with the documented assessment and plan as documented by the

## 2023-04-25 NOTE — PROGRESS NOTES
Michael Hurst (:  1990) is a 28 y.o. female,Established patient, here for evaluation of the following chief complaint(s):  Dizziness (Pt states that the dizziness and blurred vision has been doing better with the medication that the ER physician prescribed. )         ASSESSMENT/PLAN:  1. Primary hypertension  -     Comprehensive Metabolic Panel; Future  -     TSH With Reflex Ft4; Future  -     CBC with Auto Differential; Future  -     hydroCHLOROthiazide (MICROZIDE) 12.5 MG capsule; Take 1 capsule by mouth every morning, Disp-30 capsule, R-5Normal  2. Benign paroxysmal positional vertigo, unspecified laterality  3. Lipid screening  -     Lipid Panel; Future  4. Seasonal allergies  -     fluticasone (FLONASE) 50 MCG/ACT nasal spray; 2 sprays by Each Nostril route daily, Disp-16 g, R-0Normal  -     cetirizine (ZYRTEC) 10 MG tablet; Take 1 tablet by mouth daily, Disp-30 tablet, R-0Normal    Labs as above  Htn: will change metoprolol to hctz and get bmp, f/u in one month for bp check  Bppv: resolved  Seasonal allergies: will trial flonase and zyrtec as above. Return in about 4 weeks (around 2023) for htn. Subjective   SUBJECTIVE/OBJECTIVE:  HPI  Presents as ed/uc f/u for dizziness, blurred vision, headache. Was seen on 2023. Did have bp elevated to 166/112 at that time. Dx with bppv, started on meclizine tid prn and given exercises. Symptoms have resolved, no acute concerns/complaints today. She does however feel like she is getting seasonal allergies. Never had before. Sneezing, runny nose, itchy/watery eyes. With last daughter developed HTN. Bp never the same since then. [facility]. Will try to get results. , no hx of abnormal paps. 5 children age 15 to 2. Review of Systems   Constitutional:  Negative for fatigue and fever. HENT:  Positive for rhinorrhea and sneezing. Eyes:  Positive for itching.    Respiratory:  Negative for cough and shortness

## 2023-05-04 RX ORDER — METOPROLOL SUCCINATE 25 MG/1
TABLET, EXTENDED RELEASE ORAL
Qty: 30 TABLET | Refills: 0 | OUTPATIENT
Start: 2023-05-04

## 2023-05-25 PROBLEM — Z13.220 LIPID SCREENING: Status: RESOLVED | Noted: 2023-04-25 | Resolved: 2023-05-25

## 2023-07-31 ENCOUNTER — HOSPITAL ENCOUNTER (EMERGENCY)
Age: 33
Discharge: HOME | End: 2023-07-31
Payer: COMMERCIAL

## 2023-07-31 VITALS
OXYGEN SATURATION: 97 % | BODY MASS INDEX: 25.11 KG/M2 | RESPIRATION RATE: 18 BRPM | DIASTOLIC BLOOD PRESSURE: 81 MMHG | TEMPERATURE: 97.6 F | SYSTOLIC BLOOD PRESSURE: 122 MMHG | HEIGHT: 67 IN | WEIGHT: 160 LBS | HEART RATE: 68 BPM

## 2023-07-31 DIAGNOSIS — T63.441A ALLERGIC REACTION TO BEE STING: Primary | ICD-10-CM

## 2023-07-31 PROCEDURE — 2500000003 HC RX 250 WO HCPCS: Performed by: EMERGENCY MEDICINE

## 2023-07-31 PROCEDURE — 6360000002 HC RX W HCPCS: Performed by: EMERGENCY MEDICINE

## 2023-07-31 PROCEDURE — 2580000003 HC RX 258: Performed by: EMERGENCY MEDICINE

## 2023-07-31 PROCEDURE — 99214 OFFICE O/P EST MOD 30 MIN: CPT

## 2023-07-31 PROCEDURE — 96375 TX/PRO/DX INJ NEW DRUG ADDON: CPT

## 2023-07-31 PROCEDURE — 99214 OFFICE O/P EST MOD 30 MIN: CPT | Performed by: EMERGENCY MEDICINE

## 2023-07-31 PROCEDURE — 96374 THER/PROPH/DIAG INJ IV PUSH: CPT

## 2023-07-31 RX ORDER — PREDNISONE 20 MG/1
20 TABLET ORAL 2 TIMES DAILY
Qty: 6 TABLET | Refills: 0 | Status: SHIPPED | OUTPATIENT
Start: 2023-07-31 | End: 2023-08-03

## 2023-07-31 RX ORDER — EPINEPHRINE 0.3 MG/.3ML
0.3 INJECTION SUBCUTANEOUS ONCE
Qty: 1 EACH | Refills: 0 | Status: SHIPPED | OUTPATIENT
Start: 2023-07-31 | End: 2023-07-31

## 2023-07-31 RX ORDER — METHYLPREDNISOLONE SODIUM SUCCINATE 125 MG/2ML
80 INJECTION, POWDER, LYOPHILIZED, FOR SOLUTION INTRAMUSCULAR; INTRAVENOUS ONCE
Status: COMPLETED | OUTPATIENT
Start: 2023-07-31 | End: 2023-07-31

## 2023-07-31 RX ORDER — DIPHENHYDRAMINE HYDROCHLORIDE 50 MG/ML
50 INJECTION INTRAMUSCULAR; INTRAVENOUS ONCE
Status: COMPLETED | OUTPATIENT
Start: 2023-07-31 | End: 2023-07-31

## 2023-07-31 RX ADMIN — DIPHENHYDRAMINE HYDROCHLORIDE 50 MG: 50 INJECTION, SOLUTION INTRAMUSCULAR; INTRAVENOUS at 11:19

## 2023-07-31 RX ADMIN — SODIUM CHLORIDE, PRESERVATIVE FREE 20 MG: 5 INJECTION INTRAVENOUS at 11:23

## 2023-07-31 RX ADMIN — METHYLPREDNISOLONE SODIUM SUCCINATE 80 MG: 125 INJECTION INTRAMUSCULAR; INTRAVENOUS at 11:14

## 2023-07-31 ASSESSMENT — ENCOUNTER SYMPTOMS
VOICE CHANGE: 0
COUGH: 0
SHORTNESS OF BREATH: 0
TROUBLE SWALLOWING: 0

## 2023-07-31 ASSESSMENT — PAIN - FUNCTIONAL ASSESSMENT: PAIN_FUNCTIONAL_ASSESSMENT: NONE - DENIES PAIN

## 2023-07-31 NOTE — ED TRIAGE NOTES
Was stung by bees while blowing leaves with a blower, has had a history of reaction to bee stings, was stung on neck and leg, is starting with a rash on legs. arms and face, it hurts to swallow

## 2023-07-31 NOTE — ED PROVIDER NOTES
[address]  Urgent Care Encounter       CHIEF COMPLAINT       Chief Complaint   Patient presents with    Insect Bite       Nurses Notes reviewed and I agree except as noted in the HPI. HISTORY OF PRESENT ILLNESS   Lazaro Salinas is a 35 y.o. female who presents for complaints of bee sting. She states she was stung twice, once on the left facial cheek and once on the right posterior leg. She has had hives develop on her face, left ear, legs and arm. She states she has had previous rash with bee stings in the past.  She states her throat \"feels a little funny\". She denies any wheezing, shortness of breath or feeling as if she is going to pass out. The bee sting occurred approximately 1 hour prior to arrival.    HPI    REVIEW OF SYSTEMS     Review of Systems   Constitutional:  Negative for activity change, fatigue and fever. HENT:  Negative for trouble swallowing and voice change. Respiratory:  Negative for cough and shortness of breath. Skin:  Positive for rash (hives). PAST MEDICAL HISTORY         Diagnosis Date    ADHD (attention deficit hyperactivity disorder) 1997    Anxiety     Depression     Headache(784.0)     Hypertension        SURGICALHISTORY     Patient  has a past surgical history that includes LEEP (03/08/2013) and Colonoscopy ([id number]). CURRENT MEDICATIONS       Previous Medications    FLUTICASONE (FLONASE) 50 MCG/ACT NASAL SPRAY    2 sprays by Each Nostril route daily    HYDROCHLOROTHIAZIDE (MICROZIDE) 12.5 MG CAPSULE    Take 1 capsule by mouth every morning    METOPROLOL SUCCINATE (TOPROL XL) 25 MG EXTENDED RELEASE TABLET    Take 1 tablet by mouth daily       ALLERGIES     Patient is is allergic to bee venom and codeine.     Patients   Immunization History   Administered Date(s) Administered    TDaP, ADACEL (age 6y-58y), Ana Grimm (age 10y+), IM, 0.5mL 11/17/2020       FAMILY HISTORY     Patient's family history includes Anxiety Disorder in her

## 2023-07-31 NOTE — DISCHARGE INSTRUCTIONS
Use EpiPen if you were stung again and you have throat swelling, chest tightness or shortness of breath.   You should keep this with you    Take the prednisone as directed if the hives recur    Benadryl 1 to 2 tablets every 8 hours as needed for hives/itch    Drink plenty of water    Go to the emergency department if you develop any additional throat tightness, chest tightness, wheezing, shortness of breath, or any new concerns

## 2023-10-27 ENCOUNTER — HOSPITAL ENCOUNTER (EMERGENCY)
Age: 33
Discharge: HOME | End: 2023-10-27
Payer: COMMERCIAL

## 2023-10-27 VITALS
SYSTOLIC BLOOD PRESSURE: 154 MMHG | TEMPERATURE: 98.3 F | WEIGHT: 165 LBS | DIASTOLIC BLOOD PRESSURE: 100 MMHG | RESPIRATION RATE: 15 BRPM | HEART RATE: 74 BPM | OXYGEN SATURATION: 98 % | BODY MASS INDEX: 25.84 KG/M2

## 2023-10-27 DIAGNOSIS — M79.10 MYALGIA: Primary | ICD-10-CM

## 2023-10-27 DIAGNOSIS — R51.9 ACUTE NONINTRACTABLE HEADACHE, UNSPECIFIED HEADACHE TYPE: ICD-10-CM

## 2023-10-27 LAB
FLUAV AG SPEC QL: NEGATIVE
FLUBV AG SPEC QL: NEGATIVE
SARS-COV-2 RDRP RESP QL NAA+PROBE: NOT  DETECTED

## 2023-10-27 PROCEDURE — 96372 THER/PROPH/DIAG INJ SC/IM: CPT

## 2023-10-27 PROCEDURE — 87635 SARS-COV-2 COVID-19 AMP PRB: CPT

## 2023-10-27 PROCEDURE — 6360000002 HC RX W HCPCS: Performed by: EMERGENCY MEDICINE

## 2023-10-27 PROCEDURE — 87804 INFLUENZA ASSAY W/OPTIC: CPT

## 2023-10-27 PROCEDURE — 99212 OFFICE O/P EST SF 10 MIN: CPT | Performed by: EMERGENCY MEDICINE

## 2023-10-27 PROCEDURE — 99213 OFFICE O/P EST LOW 20 MIN: CPT

## 2023-10-27 RX ORDER — KETOROLAC TROMETHAMINE 30 MG/ML
30 INJECTION, SOLUTION INTRAMUSCULAR; INTRAVENOUS ONCE
Status: COMPLETED | OUTPATIENT
Start: 2023-10-27 | End: 2023-10-27

## 2023-10-27 RX ORDER — NAPROXEN 500 MG/1
500 TABLET ORAL 2 TIMES DAILY WITH MEALS
Qty: 60 TABLET | Refills: 0 | Status: SHIPPED | OUTPATIENT
Start: 2023-10-27

## 2023-10-27 RX ADMIN — KETOROLAC TROMETHAMINE 30 MG: 30 INJECTION, SOLUTION INTRAMUSCULAR; INTRAVENOUS at 16:25

## 2023-10-27 ASSESSMENT — ENCOUNTER SYMPTOMS
SINUS PAIN: 0
SINUS PRESSURE: 0
COUGH: 0
SHORTNESS OF BREATH: 0
RHINORRHEA: 0

## 2023-10-27 ASSESSMENT — PAIN DESCRIPTION - DESCRIPTORS: DESCRIPTORS: ACHING;THROBBING

## 2023-10-27 ASSESSMENT — PAIN SCALES - GENERAL: PAINLEVEL_OUTOF10: 8

## 2023-10-27 ASSESSMENT — PAIN DESCRIPTION - LOCATION: LOCATION: HEAD

## 2023-10-27 NOTE — ED PROVIDER NOTES
[address] [facility]  Urgent Care Encounter       CHIEF COMPLAINT       Chief Complaint   Patient presents with    Generalized Body Aches    Headache       Nurses Notes reviewed and I agree except as noted in the HPI. HISTORY OF PRESENT ILLNESS   Beverly Caceres is a 35 y.o. female who presents for headache and generalized body aches. Symptoms began during the night last night. She has taken Tylenol and ibuprofen today with no improvement of her symptoms. She denies fever. No cough. No sore throat. No abdominal discomfort. Denies dysuria, frequency or urgency. She rates her headache an 8 on a 10 scale. She reports that she has a history of migraines but this feels different than her previous migraines. HPI    REVIEW OF SYSTEMS     Review of Systems   Constitutional:  Negative for activity change, fatigue and fever. HENT:  Negative for congestion, rhinorrhea, sinus pressure and sinus pain. Respiratory:  Negative for cough and shortness of breath. Cardiovascular:  Negative for chest pain. Musculoskeletal:  Positive for arthralgias, myalgias and neck pain. Negative for neck stiffness. Neurological:  Positive for dizziness and headaches. Negative for light-headedness. PAST MEDICAL HISTORY         Diagnosis Date    ADHD (attention deficit hyperactivity disorder) 1997    Anxiety     Depression     Headache(784.0)     Hypertension        SURGICALHISTORY     Patient  has a past surgical history that includes LEEP (03/08/2013) and Colonoscopy ([id number]).     CURRENT MEDICATIONS       Discharge Medication List as of 10/27/2023  5:04 PM        CONTINUE these medications which have NOT CHANGED    Details   EPINEPHrine (EPIPEN 2-STEPH) 0.3 MG/0.3ML SOAJ injection Inject 0.3 mLs into the muscle once for 1 dose Use as directed for allergic reaction, Disp-1 each, R-0Normal      fluticasone (FLONASE) 50 MCG/ACT nasal spray 2 sprays by Each Nostril route daily, Disp-16 g, R-0Normal DO  [address] [address] / [employer] [address]      DISCHARGE MEDICATIONS:  Discharge Medication List as of 10/27/2023  5:04 PM        START taking these medications    Details   naproxen (NAPROSYN) 500 MG tablet Take 1 tablet by mouth 2 times daily (with meals), Disp-60 tablet, R-0Normal             Discharge Medication List as of 10/27/2023  5:04 PM          Discharge Medication List as of 10/27/2023  5:04 PM          ASHKAN Zuñiga CNP    (Please note that portions of this note were completed with a voice recognition program. Efforts were made to edit the dictations but occasionally words are mis-transcribed.)           ASHKAN Zuñiga CNP  10/27/23 [id number]

## 2023-10-27 NOTE — DISCHARGE INSTRUCTIONS
Naproxen as directed as needed for headache/body aches. You may also take Tylenol every 6 hours as needed    Drink plenty of water    Follow-up with family physician return here if no improvement in symptoms in 3 to 5 days.   Sooner if worse

## 2024-03-12 ASSESSMENT — PATIENT HEALTH QUESTIONNAIRE - PHQ9
SUM OF ALL RESPONSES TO PHQ QUESTIONS 1-9: 2
SUM OF ALL RESPONSES TO PHQ QUESTIONS 1-9: 2
1. LITTLE INTEREST OR PLEASURE IN DOING THINGS: SEVERAL DAYS
SUM OF ALL RESPONSES TO PHQ9 QUESTIONS 1 & 2: 2
2. FEELING DOWN, DEPRESSED OR HOPELESS: SEVERAL DAYS
SUM OF ALL RESPONSES TO PHQ QUESTIONS 1-9: 2
1. LITTLE INTEREST OR PLEASURE IN DOING THINGS: 1
2. FEELING DOWN, DEPRESSED OR HOPELESS: 1
SUM OF ALL RESPONSES TO PHQ9 QUESTIONS 1 & 2: 2
SUM OF ALL RESPONSES TO PHQ QUESTIONS 1-9: 2

## 2024-03-14 ENCOUNTER — OFFICE VISIT (OUTPATIENT)
Dept: FAMILY MEDICINE CLINIC | Age: 34
End: 2024-03-14
Payer: COMMERCIAL

## 2024-03-14 ENCOUNTER — NURSE ONLY (OUTPATIENT)
Dept: LAB | Age: 34
End: 2024-03-14

## 2024-03-14 ENCOUNTER — TELEPHONE (OUTPATIENT)
Dept: FAMILY MEDICINE CLINIC | Age: 34
End: 2024-03-14

## 2024-03-14 VITALS
OXYGEN SATURATION: 98 % | DIASTOLIC BLOOD PRESSURE: 102 MMHG | RESPIRATION RATE: 16 BRPM | HEART RATE: 75 BPM | SYSTOLIC BLOOD PRESSURE: 146 MMHG | HEIGHT: 67 IN | BODY MASS INDEX: 25.9 KG/M2 | WEIGHT: 165 LBS | TEMPERATURE: 98.7 F

## 2024-03-14 DIAGNOSIS — Z76.89 ENCOUNTER TO ESTABLISH CARE: ICD-10-CM

## 2024-03-14 DIAGNOSIS — Z76.89 ENCOUNTER TO ESTABLISH CARE: Primary | ICD-10-CM

## 2024-03-14 DIAGNOSIS — G43.E19 INTRACTABLE CHRONIC MIGRAINE WITH AURA AND WITHOUT STATUS MIGRAINOSUS: ICD-10-CM

## 2024-03-14 DIAGNOSIS — Z30.42 ENCOUNTER FOR MANAGEMENT AND INJECTION OF DEPO-PROVERA: ICD-10-CM

## 2024-03-14 DIAGNOSIS — I10 PRIMARY HYPERTENSION: ICD-10-CM

## 2024-03-14 DIAGNOSIS — R03.0 ELEVATED BLOOD PRESSURE READING: ICD-10-CM

## 2024-03-14 LAB
ANION GAP SERPL CALC-SCNC: 12 MEQ/L (ref 8–16)
BUN SERPL-MCNC: 10 MG/DL (ref 7–22)
CALCIUM SERPL-MCNC: 9.1 MG/DL (ref 8.5–10.5)
CHLORIDE SERPL-SCNC: 105 MEQ/L (ref 98–111)
CO2 SERPL-SCNC: 24 MEQ/L (ref 23–33)
CREAT SERPL-MCNC: 0.8 MG/DL (ref 0.4–1.2)
GFR SERPL CREATININE-BSD FRML MDRD: > 60 ML/MIN/1.73M2
GLUCOSE SERPL-MCNC: 89 MG/DL (ref 70–108)
HCV IGG SERPL QL IA: NEGATIVE
HIV 1+2 AB+HIV1 P24 AG SERPL QL IA: NORMAL
POTASSIUM SERPL-SCNC: 4.1 MEQ/L (ref 3.5–5.2)
SODIUM SERPL-SCNC: 141 MEQ/L (ref 135–145)
T4 FREE SERPL-MCNC: 1.06 NG/DL (ref 0.93–1.68)
TSH SERPL DL<=0.005 MIU/L-ACNC: 0.9 UIU/ML (ref 0.4–4.2)

## 2024-03-14 PROCEDURE — 3077F SYST BP >= 140 MM HG: CPT

## 2024-03-14 PROCEDURE — 99214 OFFICE O/P EST MOD 30 MIN: CPT

## 2024-03-14 PROCEDURE — G8419 CALC BMI OUT NRM PARAM NOF/U: HCPCS

## 2024-03-14 PROCEDURE — 3080F DIAST BP >= 90 MM HG: CPT

## 2024-03-14 PROCEDURE — G8427 DOCREV CUR MEDS BY ELIG CLIN: HCPCS

## 2024-03-14 PROCEDURE — 1036F TOBACCO NON-USER: CPT

## 2024-03-14 PROCEDURE — G8484 FLU IMMUNIZE NO ADMIN: HCPCS

## 2024-03-14 RX ORDER — METRONIDAZOLE 500 MG/1
500 TABLET ORAL 2 TIMES DAILY
COMMUNITY
Start: 2024-03-10 | End: 2024-03-17

## 2024-03-14 ASSESSMENT — ENCOUNTER SYMPTOMS
NAUSEA: 1
DIARRHEA: 0
CONSTIPATION: 0
COUGH: 0
BLOOD IN STOOL: 0
SHORTNESS OF BREATH: 0
SINUS PRESSURE: 0
VOMITING: 0
ABDOMINAL PAIN: 0
SORE THROAT: 0
RHINORRHEA: 0
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
## 2024-03-18 ENCOUNTER — TELEPHONE (OUTPATIENT)
Dept: FAMILY MEDICINE CLINIC | Age: 34
End: 2024-03-18

## 2024-03-18 NOTE — TELEPHONE ENCOUNTER
Fax from the [facility] asking for a script change. Strength Validation Script Says 240 Aimovig is 140.  Please clarify/ adjust script and advice  .  Form is scanned into patients media and attached .

## 2024-03-18 NOTE — TELEPHONE ENCOUNTER
Regarding: Prescription   Contact: [phone]  ----- Message from Mark Pham DO sent at 3/18/2024 12:58 PM EDT -----       ----- Message from Ella Willoughby to Mark Pham DO sent at 3/14/2024  2:50 PM -----   Yes the pharmacy said that the prescription for the shot got sent in wrong and they messaged you and sent it back that it wasn’t correct

## 2024-03-18 NOTE — TELEPHONE ENCOUNTER
Insurance needs a prior auth for Aimovig. We will trial oral medication first, and if she fails treatment with oral medication then insurance may approve monthly injections that we discussed during her appointment. Thanks! TM

## 2024-03-19 RX ORDER — METOPROLOL SUCCINATE 25 MG/1
25 TABLET, EXTENDED RELEASE ORAL DAILY
Qty: 30 TABLET | Refills: 0 | Status: CANCELLED | OUTPATIENT
Start: 2024-03-19

## 2024-03-19 NOTE — TELEPHONE ENCOUNTER
Patient's last appointment was : 3/14/2024 with our   Patient's next appointment is : Visit date not found   Last refilled on: 4-13-23  Which pharmacy does the script need sent to: [facility] [address]      Pt states she is completely out of this medication.      No results found for: \"LABA1C\"  No results found for: \"CHOL\", \"TRIG\", \"HDL\", \"LDLCALC\", \"LDLDIRECT\"  Lab Results   Component Value Date     03/14/2024    K 4.1 03/14/2024     03/14/2024    CO2 24 03/14/2024    BUN 10 03/14/2024    CREATININE 0.8 03/14/2024    GLUCOSE 89 03/14/2024    CALCIUM 9.1 03/14/2024    PROT 6.4 11/16/2020    LABALBU 3.7 11/16/2020    BILITOT 0.5 11/16/2020    ALKPHOS 128 (H) 11/16/2020    AST 15 11/16/2020    ALT 9 (L) 11/16/2020    LABGLOM >60 03/14/2024     Lab Results   Component Value Date    TSH 0.900 03/14/2024    T4FREE 1.06 03/14/2024     Lab Results   Component Value Date    WBC 10.4 11/16/2020    HGB 12.0 11/16/2020    HCT 36.2 (L) 11/16/2020    MCV 92.8 11/16/2020     11/16/2020

## 2024-03-20 NOTE — TELEPHONE ENCOUNTER
She is already on a beta-blocker (propranolol/ Inderal) that I prescribed for migraine prophylaxis (prevention), but can also be used to treat high blood pressure. I cannot fill the metoprolol as being on two medications from the same class would be contraindicated. The metoprolol was also discontinued last Spring 2023 and switched to HCTZ by previous provider Dr. Kaiden Springer - did she self- discontinue this? I can refill that.  Thanks! TM

## 2024-03-28 ENCOUNTER — HOSPITAL ENCOUNTER (OUTPATIENT)
Dept: MRI IMAGING | Age: 34
Discharge: HOME | End: 2024-03-28
Payer: COMMERCIAL

## 2024-03-28 DIAGNOSIS — G43.E19 INTRACTABLE CHRONIC MIGRAINE WITH AURA AND WITHOUT STATUS MIGRAINOSUS: ICD-10-CM

## 2024-03-28 PROCEDURE — A9579 GAD-BASE MR CONTRAST NOS,1ML: HCPCS

## 2024-03-28 PROCEDURE — 6360000004 HC RX CONTRAST MEDICATION

## 2024-03-28 PROCEDURE — 70553 MRI BRAIN STEM W/O & W/DYE: CPT

## 2024-03-28 RX ADMIN — GADOTERIDOL 15 ML: 279.3 INJECTION, SOLUTION INTRAVENOUS at 19:57

## 2024-03-29 ENCOUNTER — TELEPHONE (OUTPATIENT)
Dept: FAMILY MEDICINE CLINIC | Age: 34
End: 2024-03-29

## 2024-03-29 NOTE — TELEPHONE ENCOUNTER
----- Message from Mark Pham DO sent at 3/29/2024  7:50 AM EDT -----  Please let patient know Brain MRI was normal. Thanks! TM

## 2024-03-29 NOTE — TELEPHONE ENCOUNTER
Has Neurology from [facility] contacted her yet? I put in an external referral during her last visit 3/14/24. If not, she should try calling her insurance company to see which Neurologists are covered and then I can put in a new referral. Thanks! TM

## 2024-03-29 NOTE — TELEPHONE ENCOUNTER
Called and spoke to patient, reviewed result. Patient voiced understanding. Stated she wanted to let Dr Pham know that he medication that she prescribed her for the headaches was making them worse. Stated she had to stop taking, last does was on Wednesday. Wasn't sure if there was something else that could be prescribed.

## 2024-03-29 NOTE — TELEPHONE ENCOUNTER
Called and spoke to patient. Stated that she has not heard from [facility] yet. Stated I would resend the referral. Stated that she was in contact with a  patient care rep through her sister. Looks like she works for [employer] as well.

## 2024-04-08 ENCOUNTER — NURSE ONLY (OUTPATIENT)
Dept: LAB | Age: 34
End: 2024-04-08

## 2024-04-10 RX ORDER — PROPRANOLOL HCL 60 MG
60 CAPSULE, EXTENDED RELEASE 24HR ORAL DAILY
Qty: 30 CAPSULE | Refills: 0 | OUTPATIENT
Start: 2024-04-10

## 2024-04-10 NOTE — TELEPHONE ENCOUNTER
Patient's last appointment was : 3/14/2024 with our   Patient's next appointment is : Visit date not found with our DrLee   Last refilled on: 03/18/2024  Which pharmacy does the script need sent to: [facility] [address]      No results found for: \"LABA1C\"  No results found for: \"CHOL\", \"TRIG\", \"HDL\", \"LDLCALC\", \"LDLDIRECT\"  Lab Results   Component Value Date     03/14/2024    K 4.1 03/14/2024     03/14/2024    CO2 24 03/14/2024    BUN 10 03/14/2024    CREATININE 0.8 03/14/2024    GLUCOSE 89 03/14/2024    CALCIUM 9.1 03/14/2024    PROT 6.4 11/16/2020    LABALBU 3.7 11/16/2020    BILITOT 0.5 11/16/2020    ALKPHOS 128 (H) 11/16/2020    AST 15 11/16/2020    ALT 9 (L) 11/16/2020    LABGLOM >60 03/14/2024     Lab Results   Component Value Date    TSH 0.900 03/14/2024    T4FREE 1.06 03/14/2024     Lab Results   Component Value Date    WBC 10.4 11/16/2020    HGB 12.0 11/16/2020    HCT 36.2 (L) 11/16/2020    MCV 92.8 11/16/2020     11/16/2020

## 2024-04-18 LAB — CYTOLOGY THIN PREP PAP: NORMAL

## 2024-06-07 ENCOUNTER — HOSPITAL ENCOUNTER (EMERGENCY)
Age: 34
Discharge: HOME | End: 2024-06-07
Payer: COMMERCIAL

## 2024-06-07 VITALS
OXYGEN SATURATION: 97 % | DIASTOLIC BLOOD PRESSURE: 112 MMHG | WEIGHT: 161.4 LBS | TEMPERATURE: 99.2 F | BODY MASS INDEX: 25.28 KG/M2 | RESPIRATION RATE: 20 BRPM | SYSTOLIC BLOOD PRESSURE: 156 MMHG | HEART RATE: 84 BPM

## 2024-06-07 DIAGNOSIS — L23.7 POISON IVY DERMATITIS: Primary | ICD-10-CM

## 2024-06-07 PROCEDURE — 6360000002 HC RX W HCPCS

## 2024-06-07 PROCEDURE — 99213 OFFICE O/P EST LOW 20 MIN: CPT

## 2024-06-07 PROCEDURE — 96372 THER/PROPH/DIAG INJ SC/IM: CPT

## 2024-06-07 RX ORDER — METHYLPREDNISOLONE ACETATE 80 MG/ML
80 INJECTION, SUSPENSION INTRA-ARTICULAR; INTRALESIONAL; INTRAMUSCULAR; SOFT TISSUE ONCE
Status: COMPLETED | OUTPATIENT
Start: 2024-06-07 | End: 2024-06-07

## 2024-06-07 RX ORDER — PREDNISONE 20 MG/1
TABLET ORAL
Qty: 11 TABLET | Refills: 0 | Status: SHIPPED | OUTPATIENT
Start: 2024-06-07 | End: 2024-06-15

## 2024-06-07 RX ADMIN — METHYLPREDNISOLONE ACETATE 80 MG: 80 INJECTION, SUSPENSION INTRA-ARTICULAR; INTRALESIONAL; INTRAMUSCULAR; SOFT TISSUE at 16:54

## 2024-06-07 ASSESSMENT — PAIN - FUNCTIONAL ASSESSMENT
PAIN_FUNCTIONAL_ASSESSMENT: ACTIVITIES ARE NOT PREVENTED
PAIN_FUNCTIONAL_ASSESSMENT: 0-10

## 2024-06-07 ASSESSMENT — PAIN DESCRIPTION - ONSET: ONSET: SUDDEN

## 2024-06-07 ASSESSMENT — PAIN DESCRIPTION - FREQUENCY: FREQUENCY: CONTINUOUS

## 2024-06-07 ASSESSMENT — PAIN DESCRIPTION - PAIN TYPE: TYPE: ACUTE PAIN

## 2024-06-07 ASSESSMENT — PAIN DESCRIPTION - LOCATION: LOCATION: LEG

## 2024-06-07 ASSESSMENT — PAIN SCALES - GENERAL: PAINLEVEL_OUTOF10: 2

## 2024-06-07 NOTE — DISCHARGE INSTRUCTIONS
Steroids as prescribed  Benadryl every 6 hours for itching  Hydrocortisone cream or calamine lotion  Follow up with PCP as needed  If symptoms worsen come back or see pcp

## 2024-07-25 ENCOUNTER — OFFICE VISIT (OUTPATIENT)
Dept: FAMILY MEDICINE CLINIC | Age: 34
End: 2024-07-25

## 2024-07-25 VITALS
SYSTOLIC BLOOD PRESSURE: 160 MMHG | BODY MASS INDEX: 25.39 KG/M2 | RESPIRATION RATE: 12 BRPM | TEMPERATURE: 98.1 F | HEART RATE: 75 BPM | HEIGHT: 67 IN | OXYGEN SATURATION: 97 % | DIASTOLIC BLOOD PRESSURE: 110 MMHG | WEIGHT: 161.8 LBS

## 2024-07-25 DIAGNOSIS — M25.532 LEFT WRIST PAIN: Primary | ICD-10-CM

## 2024-07-25 DIAGNOSIS — I10 PRIMARY HYPERTENSION: ICD-10-CM

## 2024-07-25 RX ORDER — PSEUDOEPHED/ACETAMINOPH/DIPHEN 30MG-500MG
500 TABLET ORAL 4 TIMES DAILY PRN
Qty: 90 TABLET | Refills: 0 | Status: SHIPPED | OUTPATIENT
Start: 2024-07-25

## 2024-07-25 RX ORDER — AMLODIPINE BESYLATE 5 MG/1
5 TABLET ORAL DAILY
Qty: 30 TABLET | Refills: 3 | Status: SHIPPED | OUTPATIENT
Start: 2024-07-25
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
## 2024-08-06 ENCOUNTER — HOSPITAL ENCOUNTER (EMERGENCY)
Age: 34
Discharge: HOME | End: 2024-08-06
Payer: COMMERCIAL

## 2024-08-06 VITALS
SYSTOLIC BLOOD PRESSURE: 137 MMHG | HEART RATE: 89 BPM | WEIGHT: 163 LBS | BODY MASS INDEX: 25.52 KG/M2 | TEMPERATURE: 98.9 F | OXYGEN SATURATION: 98 % | RESPIRATION RATE: 20 BRPM | DIASTOLIC BLOOD PRESSURE: 85 MMHG

## 2024-08-06 DIAGNOSIS — T63.441A BEE STING, ACCIDENTAL OR UNINTENTIONAL, INITIAL ENCOUNTER: Primary | ICD-10-CM

## 2024-08-06 PROCEDURE — 96372 THER/PROPH/DIAG INJ SC/IM: CPT

## 2024-08-06 PROCEDURE — 6370000000 HC RX 637 (ALT 250 FOR IP): Performed by: NURSE PRACTITIONER

## 2024-08-06 PROCEDURE — 2580000003 HC RX 258: Performed by: NURSE PRACTITIONER

## 2024-08-06 PROCEDURE — 6360000002 HC RX W HCPCS: Performed by: NURSE PRACTITIONER

## 2024-08-06 PROCEDURE — 99214 OFFICE O/P EST MOD 30 MIN: CPT

## 2024-08-06 PROCEDURE — 99213 OFFICE O/P EST LOW 20 MIN: CPT | Performed by: NURSE PRACTITIONER

## 2024-08-06 RX ORDER — FAMOTIDINE 20 MG/1
20 TABLET, FILM COATED ORAL 2 TIMES DAILY
Status: DISCONTINUED | OUTPATIENT
Start: 2024-08-06 | End: 2024-08-06

## 2024-08-06 RX ORDER — FAMOTIDINE 20 MG/1
20 TABLET, FILM COATED ORAL ONCE
Status: COMPLETED | OUTPATIENT
Start: 2024-08-06 | End: 2024-08-06

## 2024-08-06 RX ORDER — ONDANSETRON 4 MG/1
4 TABLET, ORALLY DISINTEGRATING ORAL ONCE
Status: COMPLETED | OUTPATIENT
Start: 2024-08-06 | End: 2024-08-06

## 2024-08-06 RX ORDER — EPINEPHRINE 0.3 MG/.3ML
0.3 INJECTION SUBCUTANEOUS
Qty: 1 EACH | Refills: 0 | Status: SHIPPED | OUTPATIENT
Start: 2024-08-06 | End: 2024-08-06

## 2024-08-06 RX ORDER — PREDNISONE 20 MG/1
40 TABLET ORAL DAILY
Qty: 10 TABLET | Refills: 0 | Status: SHIPPED | OUTPATIENT
Start: 2024-08-06 | End: 2024-08-11

## 2024-08-06 RX ORDER — DIPHENHYDRAMINE HCL 25 MG
50 TABLET ORAL ONCE
Status: COMPLETED | OUTPATIENT
Start: 2024-08-06 | End: 2024-08-06

## 2024-08-06 RX ADMIN — FAMOTIDINE 20 MG: 20 TABLET, FILM COATED ORAL at 15:33

## 2024-08-06 RX ADMIN — WATER 125 MG: 1 INJECTION INTRAMUSCULAR; INTRAVENOUS; SUBCUTANEOUS at 15:30

## 2024-08-06 RX ADMIN — DIPHENHYDRAMINE HYDROCHLORIDE 50 MG: 25 TABLET ORAL at 15:30

## 2024-08-06 RX ADMIN — ONDANSETRON 4 MG: 4 TABLET, ORALLY DISINTEGRATING ORAL at 15:37

## 2024-08-06 ASSESSMENT — ENCOUNTER SYMPTOMS
VOMITING: 0
COUGH: 0
SORE THROAT: 0
RHINORRHEA: 0
SHORTNESS OF BREATH: 0
DIARRHEA: 0
CHEST TIGHTNESS: 0
NAUSEA: 0

## 2024-08-06 ASSESSMENT — PAIN - FUNCTIONAL ASSESSMENT
PAIN_FUNCTIONAL_ASSESSMENT: NONE - DENIES PAIN
PAIN_FUNCTIONAL_ASSESSMENT: NONE - DENIES PAIN

## 2024-08-06 NOTE — PROGRESS NOTES
Patient walking around room. Denies concerns. Speech clear, appropriate. No respiratory distress notes at this time.

## 2024-08-06 NOTE — ED PROVIDER NOTES
[facility] URGENT CARE  Urgent Care Encounter       CHIEF COMPLAINT       Chief Complaint   Patient presents with    Insect Bite     Hornet sting, left shoulder       Nurses Notes reviewed and I agree except as noted in the HPI.  HISTORY OF PRESENT ILLNESS   Ella Willoughby is a 34 y.o. female who presents to the with urgent care for evaluation of a hornet sting.  She reports that this occurred shortly prior to arrival.  The sting is located on the posterior left shoulder.  There is mild erythema around the stent.  She was seen here roughly 1 year ago for a bee sting in which she had an anaphylactic reaction.  She currently denies oral edema, scratchy throat, or dyspnea.  Besides the local skin reaction she denies symptoms.    The history is provided by the patient. No  was used.       REVIEW OF SYSTEMS     Review of Systems   Constitutional:  Negative for activity change, appetite change, chills, fatigue and fever.   HENT:  Negative for ear discharge, ear pain, rhinorrhea and sore throat.    Respiratory:  Negative for cough, chest tightness and shortness of breath.    Cardiovascular:  Negative for chest pain.   Gastrointestinal:  Negative for diarrhea, nausea and vomiting.   Genitourinary:  Negative for dysuria.   Skin:  Positive for wound. Negative for rash.   Allergic/Immunologic: Negative for environmental allergies and food allergies.   Neurological:  Negative for dizziness and headaches.       PAST MEDICAL HISTORY         Diagnosis Date    ADHD (attention deficit hyperactivity disorder) 1997    Anxiety     Depression     Headache(784.0)     Hypertension        SURGICALHISTORY     Patient  has a past surgical history that includes LEEP (03/08/2013) and Colonoscopy ([id number]).    CURRENT MEDICATIONS       Previous Medications    ACETAMINOPHEN 500 MG TABS    Take 1 tablet by mouth 4 times daily as needed for Pain    AMLODIPINE (NORVASC) 5 MG TABLET    Take 1 tablet by mouth

## 2024-08-06 NOTE — ED TRIAGE NOTES
Patient to room from registration. C/o hornet sting to left, upper back and right forearm occurring one minute prior to arrival. States previous sever reaction to wasp sting. Provider notified. Medications administered. Patient speech clear, appropriate. No respiratory distress noted at this time.

## 2025-01-06 ENCOUNTER — E-VISIT (OUTPATIENT)
Dept: PRIMARY CARE CLINIC | Age: 35
End: 2025-01-06
Payer: COMMERCIAL

## 2025-01-06 DIAGNOSIS — B85.0 HEAD LICE: Primary | ICD-10-CM

## 2025-01-06 PROCEDURE — 99423 OL DIG E/M SVC 21+ MIN: CPT | Performed by: NURSE PRACTITIONER

## 2025-01-06 RX ORDER — PIPERONYL BUTOXIDE, PYRETHRUM EXTRACT 4; .33 G/100ML; G/100ML
SHAMPOO TOPICAL
Qty: 118 ML | Refills: 0 | Status: SHIPPED | OUTPATIENT
Start: 2025-01-06 | End: 2025-01-09

## 2025-01-06 RX ORDER — SPINOSAD 9 MG/ML
SUSPENSION TOPICAL
Qty: 120 ML | Refills: 0 | Status: SHIPPED | OUTPATIENT
Start: 2025-01-06

## 2025-01-06 NOTE — PROGRESS NOTES
Ella Willoughby (1990) initiated an asynchronous digital communication through [employer].    HPI: per patient questionnaire     Exam: not applicable    Diagnoses and all orders for this visit:  Diagnoses and all orders for this visit:    Head lice  -     spinosad (NATROBA) 0.9 % SUSP topical suspension; Apply to dry scalp and hair, wash off after 10 minutes,may repeat in 7 days    Other orders  -     pyrethrins-piperonyl butoxide (LICE KILLING SHAMPOO MAX STR) 0.33-4 % shampoo; Apply topically once.    Medication sent.  Will attach patient instructions.  Follow-up with PCP  Changed d/t insurance coverage    Time: EV3-21 minutes were spent on the digital evaluation and management of this patient. 25 min     ASHKAN Robles - CNP

## 2025-05-23 DIAGNOSIS — I10 PRIMARY HYPERTENSION: ICD-10-CM

## 2025-05-23 NOTE — TELEPHONE ENCOUNTER
Patient's last appointment was: 7/25/2024  with our   Patient's next appointment is: Visit date not found  with our DrLee   Last refilled on: 07/25/2024  Which pharmacy does the script need sent to: [facility]      No results found for: \"LABA1C\"  No results found for: \"CHOL\", \"TRIG\", \"HDL\", \"LDLDIRECT\"  Lab Results   Component Value Date     03/14/2024    K 4.1 03/14/2024     03/14/2024    CO2 24 03/14/2024    BUN 10 03/14/2024    CREATININE 0.8 03/14/2024    GLUCOSE 89 03/14/2024    CALCIUM 9.1 03/14/2024    BILITOT 0.5 11/16/2020    ALKPHOS 128 (H) 11/16/2020    AST 15 11/16/2020    ALT 9 (L) 11/16/2020    LABGLOM >60 03/14/2024     Lab Results   Component Value Date    TSH 0.900 03/14/2024    T4FREE 1.06 03/14/2024     Lab Results   Component Value Date    WBC 10.4 11/16/2020    HGB 12.0 11/16/2020    HCT 36.2 (L) 11/16/2020    MCV 92.8 11/16/2020     11/16/2020

## 2025-05-26 RX ORDER — AMLODIPINE BESYLATE 5 MG/1
5 TABLET ORAL DAILY
Qty: 30 TABLET | Refills: 0 | Status: SHIPPED | OUTPATIENT
Start: 2025-05-26

## 2025-05-26 NOTE — TELEPHONE ENCOUNTER
30 days no refills sent in. Patient needs appointment. I also ordered labs ofr patient to get done before follow up if able. Thanks!!    Electronically signed by Lilian Khan DO on 5/26/2025 at 11:20 AM

## 2025-07-03 DIAGNOSIS — I10 PRIMARY HYPERTENSION: ICD-10-CM

## 2025-07-05 DIAGNOSIS — I10 PRIMARY HYPERTENSION: ICD-10-CM

## 2025-07-07 RX ORDER — AMLODIPINE BESYLATE 5 MG/1
5 TABLET ORAL DAILY
Qty: 30 TABLET | Refills: 0 | OUTPATIENT
Start: 2025-07-07

## 2025-07-07 RX ORDER — AMLODIPINE BESYLATE 5 MG/1
5 TABLET ORAL DAILY
Qty: 30 TABLET | Refills: 0 | Status: SHIPPED | OUTPATIENT
Start: 2025-07-07

## 2025-07-07 NOTE — TELEPHONE ENCOUNTER
Writer called and spoke to patient .      Patient scheduled an apt for August 25th as that fits patients schedule .      Thank you

## 2025-07-07 NOTE — TELEPHONE ENCOUNTER
Patient's last appointment was: 7/25/2024  with our   Patient's next appointment is: Visit date not found    Last refilled on: 5/26/2025  Which pharmacy does the script need sent to: [facility]      No results found for: \"LABA1C\"  No results found for: \"CHOL\", \"TRIG\", \"HDL\", \"LDLDIRECT\"  Lab Results   Component Value Date     03/14/2024    K 4.1 03/14/2024     03/14/2024    CO2 24 03/14/2024    BUN 10 03/14/2024    CREATININE 0.8 03/14/2024    GLUCOSE 89 03/14/2024    CALCIUM 9.1 03/14/2024    BILITOT 0.5 11/16/2020    ALKPHOS 128 (H) 11/16/2020    AST 15 11/16/2020    ALT 9 (L) 11/16/2020    LABGLOM >60 03/14/2024     Lab Results   Component Value Date    TSH 0.900 03/14/2024    T4FREE 1.06 03/14/2024     Lab Results   Component Value Date    WBC 10.4 11/16/2020    HGB 12.0 11/16/2020    HCT 36.2 (L) 11/16/2020    MCV 92.8 11/16/2020     11/16/2020

## 2025-08-09 DIAGNOSIS — I10 PRIMARY HYPERTENSION: ICD-10-CM

## 2025-08-12 RX ORDER — AMLODIPINE BESYLATE 5 MG/1
5 TABLET ORAL DAILY
Qty: 30 TABLET | Refills: 0 | Status: SHIPPED | OUTPATIENT
Start: 2025-08-12